# Patient Record
Sex: FEMALE | Race: WHITE | Employment: PART TIME | URBAN - NONMETROPOLITAN AREA
[De-identification: names, ages, dates, MRNs, and addresses within clinical notes are randomized per-mention and may not be internally consistent; named-entity substitution may affect disease eponyms.]

---

## 2017-07-06 ENCOUNTER — OFFICE VISIT (OUTPATIENT)
Dept: CARDIOLOGY | Age: 67
End: 2017-07-06

## 2017-07-06 VITALS
BODY MASS INDEX: 25.39 KG/M2 | HEART RATE: 60 BPM | SYSTOLIC BLOOD PRESSURE: 98 MMHG | DIASTOLIC BLOOD PRESSURE: 68 MMHG | WEIGHT: 152.6 LBS

## 2017-07-06 DIAGNOSIS — Z95.5 PRESENCE OF STENT IN LAD CORONARY ARTERY: Primary | ICD-10-CM

## 2017-07-06 PROCEDURE — G8427 DOCREV CUR MEDS BY ELIG CLIN: HCPCS | Performed by: INTERNAL MEDICINE

## 2017-07-06 PROCEDURE — 1090F PRES/ABSN URINE INCON ASSESS: CPT | Performed by: INTERNAL MEDICINE

## 2017-07-06 PROCEDURE — 1036F TOBACCO NON-USER: CPT | Performed by: INTERNAL MEDICINE

## 2017-07-06 PROCEDURE — G8419 CALC BMI OUT NRM PARAM NOF/U: HCPCS | Performed by: INTERNAL MEDICINE

## 2017-07-06 PROCEDURE — 3014F SCREEN MAMMO DOC REV: CPT | Performed by: INTERNAL MEDICINE

## 2017-07-06 PROCEDURE — 4040F PNEUMOC VAC/ADMIN/RCVD: CPT | Performed by: INTERNAL MEDICINE

## 2017-07-06 PROCEDURE — 99213 OFFICE O/P EST LOW 20 MIN: CPT | Performed by: INTERNAL MEDICINE

## 2017-07-06 PROCEDURE — 3017F COLORECTAL CA SCREEN DOC REV: CPT | Performed by: INTERNAL MEDICINE

## 2017-07-06 PROCEDURE — 1123F ACP DISCUSS/DSCN MKR DOCD: CPT | Performed by: INTERNAL MEDICINE

## 2017-07-06 PROCEDURE — G8400 PT W/DXA NO RESULTS DOC: HCPCS | Performed by: INTERNAL MEDICINE

## 2017-07-06 PROCEDURE — G8598 ASA/ANTIPLAT THER USED: HCPCS | Performed by: INTERNAL MEDICINE

## 2017-10-30 RX ORDER — ATORVASTATIN CALCIUM 40 MG/1
40 TABLET, FILM COATED ORAL DAILY
Qty: 90 TABLET | Refills: 1 | Status: SHIPPED | OUTPATIENT
Start: 2017-10-30 | End: 2019-02-04

## 2017-11-29 RX ORDER — CLOPIDOGREL BISULFATE 75 MG/1
75 TABLET ORAL DAILY
Qty: 30 TABLET | Refills: 5 | Status: SHIPPED | OUTPATIENT
Start: 2017-11-29 | End: 2018-05-04 | Stop reason: SDUPTHER

## 2018-05-04 ENCOUNTER — OFFICE VISIT (OUTPATIENT)
Dept: CARDIOLOGY CLINIC | Age: 68
End: 2018-05-04
Payer: MEDICARE

## 2018-05-04 VITALS
HEIGHT: 64 IN | WEIGHT: 149 LBS | BODY MASS INDEX: 25.44 KG/M2 | HEART RATE: 57 BPM | DIASTOLIC BLOOD PRESSURE: 64 MMHG | SYSTOLIC BLOOD PRESSURE: 98 MMHG

## 2018-05-04 DIAGNOSIS — E78.00 PURE HYPERCHOLESTEROLEMIA: ICD-10-CM

## 2018-05-04 DIAGNOSIS — I25.110 CORONARY ARTERY DISEASE INVOLVING NATIVE CORONARY ARTERY OF NATIVE HEART WITH UNSTABLE ANGINA PECTORIS (HCC): Primary | ICD-10-CM

## 2018-05-04 DIAGNOSIS — I10 ESSENTIAL HYPERTENSION: ICD-10-CM

## 2018-05-04 PROCEDURE — G8417 CALC BMI ABV UP PARAM F/U: HCPCS | Performed by: INTERNAL MEDICINE

## 2018-05-04 PROCEDURE — 1123F ACP DISCUSS/DSCN MKR DOCD: CPT | Performed by: INTERNAL MEDICINE

## 2018-05-04 PROCEDURE — 1036F TOBACCO NON-USER: CPT | Performed by: INTERNAL MEDICINE

## 2018-05-04 PROCEDURE — G8598 ASA/ANTIPLAT THER USED: HCPCS | Performed by: INTERNAL MEDICINE

## 2018-05-04 PROCEDURE — 93000 ELECTROCARDIOGRAM COMPLETE: CPT | Performed by: INTERNAL MEDICINE

## 2018-05-04 PROCEDURE — G8427 DOCREV CUR MEDS BY ELIG CLIN: HCPCS | Performed by: INTERNAL MEDICINE

## 2018-05-04 PROCEDURE — 99214 OFFICE O/P EST MOD 30 MIN: CPT | Performed by: INTERNAL MEDICINE

## 2018-05-04 PROCEDURE — G8400 PT W/DXA NO RESULTS DOC: HCPCS | Performed by: INTERNAL MEDICINE

## 2018-05-04 PROCEDURE — 1090F PRES/ABSN URINE INCON ASSESS: CPT | Performed by: INTERNAL MEDICINE

## 2018-05-04 PROCEDURE — 4040F PNEUMOC VAC/ADMIN/RCVD: CPT | Performed by: INTERNAL MEDICINE

## 2018-05-04 PROCEDURE — 3017F COLORECTAL CA SCREEN DOC REV: CPT | Performed by: INTERNAL MEDICINE

## 2018-05-04 RX ORDER — NAPROXEN 500 MG/1
500 TABLET ORAL 2 TIMES DAILY WITH MEALS
COMMUNITY
End: 2018-05-04

## 2018-05-04 RX ORDER — CLOPIDOGREL BISULFATE 75 MG/1
75 TABLET ORAL DAILY
Qty: 90 TABLET | Refills: 3 | Status: SHIPPED | OUTPATIENT
Start: 2018-05-04 | End: 2019-02-04 | Stop reason: SDUPTHER

## 2018-05-04 RX ORDER — ISOSORBIDE MONONITRATE 30 MG/1
30 TABLET, EXTENDED RELEASE ORAL DAILY
Qty: 90 TABLET | Refills: 3 | Status: SHIPPED | OUTPATIENT
Start: 2018-05-04 | End: 2018-11-08

## 2018-05-24 ENCOUNTER — HOSPITAL ENCOUNTER (OUTPATIENT)
Dept: NON INVASIVE DIAGNOSTICS | Age: 68
Discharge: HOME | End: 2018-05-24
Payer: MEDICARE

## 2018-05-24 DIAGNOSIS — I25.110 CORONARY ARTERY DISEASE INVOLVING NATIVE CORONARY ARTERY OF NATIVE HEART WITH UNSTABLE ANGINA PECTORIS (HCC): ICD-10-CM

## 2018-05-24 DIAGNOSIS — I10 ESSENTIAL HYPERTENSION: ICD-10-CM

## 2018-05-24 DIAGNOSIS — E78.00 PURE HYPERCHOLESTEROLEMIA: ICD-10-CM

## 2018-05-24 LAB
LV EF: 60 %
LVEF MODALITY: NORMAL

## 2018-05-24 PROCEDURE — 93306 TTE W/DOPPLER COMPLETE: CPT

## 2018-11-08 ENCOUNTER — OFFICE VISIT (OUTPATIENT)
Dept: CARDIOLOGY CLINIC | Age: 68
End: 2018-11-08
Payer: MEDICARE

## 2018-11-08 VITALS
DIASTOLIC BLOOD PRESSURE: 79 MMHG | SYSTOLIC BLOOD PRESSURE: 122 MMHG | BODY MASS INDEX: 25.52 KG/M2 | HEART RATE: 59 BPM | HEIGHT: 64 IN | WEIGHT: 149.5 LBS

## 2018-11-08 DIAGNOSIS — E78.00 PURE HYPERCHOLESTEROLEMIA: ICD-10-CM

## 2018-11-08 DIAGNOSIS — I10 ESSENTIAL HYPERTENSION: ICD-10-CM

## 2018-11-08 DIAGNOSIS — Z95.5 PRESENCE OF STENT IN LAD CORONARY ARTERY: ICD-10-CM

## 2018-11-08 DIAGNOSIS — I25.10 CORONARY ARTERY DISEASE INVOLVING NATIVE CORONARY ARTERY OF NATIVE HEART, ANGINA PRESENCE UNSPECIFIED: ICD-10-CM

## 2018-11-08 DIAGNOSIS — R07.9 CHEST PAIN IN ADULT: Primary | ICD-10-CM

## 2018-11-08 PROCEDURE — 3017F COLORECTAL CA SCREEN DOC REV: CPT | Performed by: INTERNAL MEDICINE

## 2018-11-08 PROCEDURE — G8417 CALC BMI ABV UP PARAM F/U: HCPCS | Performed by: INTERNAL MEDICINE

## 2018-11-08 PROCEDURE — G8400 PT W/DXA NO RESULTS DOC: HCPCS | Performed by: INTERNAL MEDICINE

## 2018-11-08 PROCEDURE — G8484 FLU IMMUNIZE NO ADMIN: HCPCS | Performed by: INTERNAL MEDICINE

## 2018-11-08 PROCEDURE — G8427 DOCREV CUR MEDS BY ELIG CLIN: HCPCS | Performed by: INTERNAL MEDICINE

## 2018-11-08 PROCEDURE — 1036F TOBACCO NON-USER: CPT | Performed by: INTERNAL MEDICINE

## 2018-11-08 PROCEDURE — 99214 OFFICE O/P EST MOD 30 MIN: CPT | Performed by: INTERNAL MEDICINE

## 2018-11-08 PROCEDURE — 1090F PRES/ABSN URINE INCON ASSESS: CPT | Performed by: INTERNAL MEDICINE

## 2018-11-08 PROCEDURE — G8598 ASA/ANTIPLAT THER USED: HCPCS | Performed by: INTERNAL MEDICINE

## 2018-11-08 PROCEDURE — 4040F PNEUMOC VAC/ADMIN/RCVD: CPT | Performed by: INTERNAL MEDICINE

## 2018-11-08 PROCEDURE — 1123F ACP DISCUSS/DSCN MKR DOCD: CPT | Performed by: INTERNAL MEDICINE

## 2018-11-08 PROCEDURE — 93000 ELECTROCARDIOGRAM COMPLETE: CPT | Performed by: INTERNAL MEDICINE

## 2018-11-08 PROCEDURE — 1101F PT FALLS ASSESS-DOCD LE1/YR: CPT | Performed by: INTERNAL MEDICINE

## 2018-11-08 RX ORDER — CYCLOBENZAPRINE HCL 5 MG
5 TABLET ORAL NIGHTLY
COMMUNITY
End: 2019-11-11 | Stop reason: ALTCHOICE

## 2018-11-08 RX ORDER — ISOSORBIDE MONONITRATE 60 MG/1
60 TABLET, EXTENDED RELEASE ORAL DAILY
Qty: 30 TABLET | Refills: 5 | Status: SHIPPED | OUTPATIENT
Start: 2018-11-08 | End: 2019-02-04 | Stop reason: SDUPTHER

## 2018-11-08 NOTE — PROGRESS NOTES
tablet Take 1 tablet by mouth daily 90 tablet 3    Omega-3 Fatty Acids (FISH OIL PO) Take 1 capsule by mouth daily      Multiple Vitamins-Minerals (MULTIVITAL) CHEW Take 1 capsule by mouth daily      citalopram (CELEXA) 40 MG tablet Take 40 mg by mouth daily      metoprolol (LOPRESSOR) 25 MG tablet Take 12.5 mg by mouth 2 times daily      aspirin 81 MG EC tablet Take 1 tablet by mouth daily 30 tablet 0    temazepam (RESTORIL) 15 MG capsule Take 15 mg by mouth nightly as needed for Sleep      nitroGLYCERIN (NITROSTAT) 0.4 MG SL tablet Place 1 tablet under the tongue every 5 minutes as needed for Chest pain 25 tablet 3    levothyroxine (SYNTHROID) 112 MCG tablet Take 112 mcg by mouth daily.  pitavastatin (LIVALO) 2 MG TABS tablet Take 1 tablet by mouth nightly 90 tablet 3    atorvastatin (LIPITOR) 40 MG tablet Take 1 tablet by mouth daily (Patient taking differently: Take 80 mg by mouth daily ) 90 tablet 1     No current facility-administered medications for this visit. Review of Systems -     General ROS: negative  Psychological ROS: negative  Hematological and Lymphatic ROS: No history of blood clots or bleeding disorder. Respiratory ROS: no cough, shortness of breath, or wheezing  Cardiovascular ROS: see HPI  Gastrointestinal ROS: negative  Genito-Urinary ROS: no dysuria, trouble voiding, or hematuria  Musculoskeletal ROS: negative  Neurological ROS: no TIA or stroke symptoms  Dermatological ROS: negative      Blood pressure 122/79, pulse 59, height 5' 4\" (1.626 m), weight 149 lb 8 oz (67.8 kg).         Physical Examination:    General appearance - alert, well appearing, and in no distress  Mental status - alert, oriented to person, place, and time  Neck - supple, no significant adenopathy, no JVD, or carotid bruits  Chest - clear to auscultation, no wheezes, rales or rhonchi, symmetric air entry  Heart - normal rate, regular rhythm, normal S1, S2, no murmurs, rubs, clicks or control. Patient is compliant with medical treatment. The  Fatigue better after Stop HCTZ 12.5 mg po qd   No dizziness     Hyperlipidemia: on statins, followed periodically. Patient need periodic lipid and liver profile. LP and LFT under F/u with pcp      Reviewed the records    D/w the pat the plan of care    Discussed use, benefit, and side effects of prescribed medications. All patient questions answered. Pt voiced understanding. Instructed to continue current medications, diet and exercise. Continue risk factor modification and medical management. Patient agreed with treatment plan. Follow up as directed.     RTC in 3 weeks      Gaudencio Nicole [facility]

## 2018-11-12 ENCOUNTER — TELEPHONE (OUTPATIENT)
Dept: CARDIOLOGY CLINIC | Age: 68
End: 2018-11-12

## 2018-11-14 ENCOUNTER — HOSPITAL ENCOUNTER (OUTPATIENT)
Dept: NON INVASIVE DIAGNOSTICS | Age: 68
Discharge: HOME | End: 2018-11-14
Payer: MEDICARE

## 2018-11-14 VITALS — HEIGHT: 64 IN | BODY MASS INDEX: 25.27 KG/M2 | WEIGHT: 148 LBS

## 2018-11-14 DIAGNOSIS — Z95.5 PRESENCE OF STENT IN LAD CORONARY ARTERY: ICD-10-CM

## 2018-11-14 DIAGNOSIS — E78.00 PURE HYPERCHOLESTEROLEMIA: ICD-10-CM

## 2018-11-14 DIAGNOSIS — R07.9 CHEST PAIN IN ADULT: ICD-10-CM

## 2018-11-14 DIAGNOSIS — I10 ESSENTIAL HYPERTENSION: ICD-10-CM

## 2018-11-14 DIAGNOSIS — I25.10 CORONARY ARTERY DISEASE INVOLVING NATIVE CORONARY ARTERY OF NATIVE HEART, ANGINA PRESENCE UNSPECIFIED: ICD-10-CM

## 2018-11-14 PROCEDURE — 78452 HT MUSCLE IMAGE SPECT MULT: CPT

## 2018-11-14 PROCEDURE — 93017 CV STRESS TEST TRACING ONLY: CPT | Performed by: INTERNAL MEDICINE

## 2018-11-14 PROCEDURE — 2709999900 HC NON-CHARGEABLE SUPPLY

## 2018-11-14 PROCEDURE — 6360000002 HC RX W HCPCS

## 2018-11-14 PROCEDURE — A9500 TC99M SESTAMIBI: HCPCS | Performed by: INTERNAL MEDICINE

## 2018-11-14 PROCEDURE — 3430000000 HC RX DIAGNOSTIC RADIOPHARMACEUTICAL: Performed by: INTERNAL MEDICINE

## 2018-11-14 RX ADMIN — Medication 9.9 MILLICURIE: at 11:52

## 2018-11-14 RX ADMIN — Medication 33.7 MILLICURIE: at 12:50

## 2019-01-17 ENCOUNTER — TELEPHONE (OUTPATIENT)
Dept: CARDIOLOGY CLINIC | Age: 69
End: 2019-01-17

## 2019-02-04 ENCOUNTER — OFFICE VISIT (OUTPATIENT)
Dept: CARDIOLOGY CLINIC | Age: 69
End: 2019-02-04
Payer: MEDICARE

## 2019-02-04 VITALS
HEART RATE: 60 BPM | WEIGHT: 158.25 LBS | HEIGHT: 64 IN | BODY MASS INDEX: 27.02 KG/M2 | DIASTOLIC BLOOD PRESSURE: 82 MMHG | SYSTOLIC BLOOD PRESSURE: 132 MMHG

## 2019-02-04 DIAGNOSIS — I10 ESSENTIAL HYPERTENSION: ICD-10-CM

## 2019-02-04 DIAGNOSIS — Z01.818 PRE-OP EVALUATION: Primary | ICD-10-CM

## 2019-02-04 DIAGNOSIS — E78.00 PURE HYPERCHOLESTEROLEMIA: ICD-10-CM

## 2019-02-04 DIAGNOSIS — I25.10 CORONARY ARTERY DISEASE INVOLVING NATIVE CORONARY ARTERY OF NATIVE HEART WITHOUT ANGINA PECTORIS: ICD-10-CM

## 2019-02-04 DIAGNOSIS — Z95.5 PRESENCE OF STENT IN LAD CORONARY ARTERY: ICD-10-CM

## 2019-02-04 PROBLEM — R07.9 CHEST PAIN IN ADULT: Status: RESOLVED | Noted: 2018-11-08 | Resolved: 2019-02-04

## 2019-02-04 PROCEDURE — 1123F ACP DISCUSS/DSCN MKR DOCD: CPT | Performed by: INTERNAL MEDICINE

## 2019-02-04 PROCEDURE — 1101F PT FALLS ASSESS-DOCD LE1/YR: CPT | Performed by: INTERNAL MEDICINE

## 2019-02-04 PROCEDURE — 3017F COLORECTAL CA SCREEN DOC REV: CPT | Performed by: INTERNAL MEDICINE

## 2019-02-04 PROCEDURE — 1090F PRES/ABSN URINE INCON ASSESS: CPT | Performed by: INTERNAL MEDICINE

## 2019-02-04 PROCEDURE — 1036F TOBACCO NON-USER: CPT | Performed by: INTERNAL MEDICINE

## 2019-02-04 PROCEDURE — G8484 FLU IMMUNIZE NO ADMIN: HCPCS | Performed by: INTERNAL MEDICINE

## 2019-02-04 PROCEDURE — G8598 ASA/ANTIPLAT THER USED: HCPCS | Performed by: INTERNAL MEDICINE

## 2019-02-04 PROCEDURE — 99214 OFFICE O/P EST MOD 30 MIN: CPT | Performed by: INTERNAL MEDICINE

## 2019-02-04 PROCEDURE — G8417 CALC BMI ABV UP PARAM F/U: HCPCS | Performed by: INTERNAL MEDICINE

## 2019-02-04 PROCEDURE — 4040F PNEUMOC VAC/ADMIN/RCVD: CPT | Performed by: INTERNAL MEDICINE

## 2019-02-04 PROCEDURE — G8427 DOCREV CUR MEDS BY ELIG CLIN: HCPCS | Performed by: INTERNAL MEDICINE

## 2019-02-04 PROCEDURE — G8400 PT W/DXA NO RESULTS DOC: HCPCS | Performed by: INTERNAL MEDICINE

## 2019-02-04 RX ORDER — ISOSORBIDE MONONITRATE 60 MG/1
60 TABLET, EXTENDED RELEASE ORAL DAILY
Qty: 90 TABLET | Refills: 3 | Status: SHIPPED | OUTPATIENT
Start: 2019-02-04 | End: 2020-02-20

## 2019-02-04 RX ORDER — CLOPIDOGREL BISULFATE 75 MG/1
75 TABLET ORAL DAILY
Qty: 90 TABLET | Refills: 3 | Status: SHIPPED | OUTPATIENT
Start: 2019-02-04 | End: 2020-02-20

## 2019-02-07 ENCOUNTER — TELEPHONE (OUTPATIENT)
Dept: CARDIOLOGY CLINIC | Age: 69
End: 2019-02-07

## 2019-02-09 LAB
ALBUMIN SERPL-MCNC: 4.1 G/DL
ALP BLD-CCNC: 106 U/L
ALT SERPL-CCNC: 18 U/L
ANION GAP SERPL CALCULATED.3IONS-SCNC: NORMAL MMOL/L
AST SERPL-CCNC: 21 U/L
BILIRUB SERPL-MCNC: 0.2 MG/DL (ref 0.1–1.4)
BUN BLDV-MCNC: 20 MG/DL
CALCIUM SERPL-MCNC: 9.5 MG/DL
CHLORIDE BLD-SCNC: 105 MMOL/L
CHOLESTEROL, TOTAL: 292 MG/DL
CHOLESTEROL/HDL RATIO: ABNORMAL
CO2: 105 MMOL/L
CREAT SERPL-MCNC: 1.2 MG/DL
GFR CALCULATED: NORMAL
GLUCOSE BLD-MCNC: 92 MG/DL
HDLC SERPL-MCNC: 41 MG/DL (ref 35–70)
LDL CHOLESTEROL CALCULATED: 206 MG/DL (ref 0–160)
POTASSIUM SERPL-SCNC: 4.9 MMOL/L
SODIUM BLD-SCNC: 141 MMOL/L
TOTAL PROTEIN: 7
TRIGL SERPL-MCNC: 223 MG/DL
TSH SERPL DL<=0.05 MIU/L-ACNC: 0.08 UIU/ML
VLDLC SERPL CALC-MCNC: 45 MG/DL

## 2019-03-06 PROBLEM — Z01.818 PRE-OP EVALUATION: Status: RESOLVED | Noted: 2019-02-04 | Resolved: 2019-03-06

## 2019-05-06 ENCOUNTER — OFFICE VISIT (OUTPATIENT)
Dept: CARDIOLOGY CLINIC | Age: 69
End: 2019-05-06
Payer: MEDICARE

## 2019-05-06 VITALS
SYSTOLIC BLOOD PRESSURE: 128 MMHG | BODY MASS INDEX: 26.29 KG/M2 | WEIGHT: 154 LBS | HEIGHT: 64 IN | DIASTOLIC BLOOD PRESSURE: 86 MMHG

## 2019-05-06 DIAGNOSIS — I10 ESSENTIAL HYPERTENSION: ICD-10-CM

## 2019-05-06 DIAGNOSIS — E78.00 PURE HYPERCHOLESTEROLEMIA: ICD-10-CM

## 2019-05-06 DIAGNOSIS — I25.10 CORONARY ARTERY DISEASE INVOLVING NATIVE CORONARY ARTERY OF NATIVE HEART WITHOUT ANGINA PECTORIS: Primary | ICD-10-CM

## 2019-05-06 DIAGNOSIS — Z95.5 PRESENCE OF STENT IN LAD CORONARY ARTERY: ICD-10-CM

## 2019-05-06 PROCEDURE — 99214 OFFICE O/P EST MOD 30 MIN: CPT | Performed by: INTERNAL MEDICINE

## 2019-05-06 PROCEDURE — 3017F COLORECTAL CA SCREEN DOC REV: CPT | Performed by: INTERNAL MEDICINE

## 2019-05-06 PROCEDURE — G8400 PT W/DXA NO RESULTS DOC: HCPCS | Performed by: INTERNAL MEDICINE

## 2019-05-06 PROCEDURE — 1123F ACP DISCUSS/DSCN MKR DOCD: CPT | Performed by: INTERNAL MEDICINE

## 2019-05-06 PROCEDURE — G8427 DOCREV CUR MEDS BY ELIG CLIN: HCPCS | Performed by: INTERNAL MEDICINE

## 2019-05-06 PROCEDURE — 4040F PNEUMOC VAC/ADMIN/RCVD: CPT | Performed by: INTERNAL MEDICINE

## 2019-05-06 PROCEDURE — 1036F TOBACCO NON-USER: CPT | Performed by: INTERNAL MEDICINE

## 2019-05-06 PROCEDURE — 1090F PRES/ABSN URINE INCON ASSESS: CPT | Performed by: INTERNAL MEDICINE

## 2019-05-06 PROCEDURE — G8598 ASA/ANTIPLAT THER USED: HCPCS | Performed by: INTERNAL MEDICINE

## 2019-05-06 PROCEDURE — G8417 CALC BMI ABV UP PARAM F/U: HCPCS | Performed by: INTERNAL MEDICINE

## 2019-05-06 NOTE — PROGRESS NOTES
Chief Complaint   Patient presents with    Check-Up    Coronary Artery Disease   former pat of Dr. Tiffany Grey  Hx of CAD with multiple stent    Pt here for 3 mo check up     Denied cp, dizziness , palpitation ,. Edema or SOB    Off statin due to intolerance and now on rrepatha    Record reviewed    Has been cp free for several months    Patient Active Problem List   Diagnosis    Hyperlipidemia    Hypothyroidism    Hypertension    ASHD (arteriosclerotic heart disease)    Presence of stent in LAD coronary artery and RCA 40 to 50% as per cath 2012     S/P cardiac catheterization    Coronary artery disease involving native coronary artery of native heart       Past Surgical History:   Procedure Laterality Date    CARDIAC CATHETERIZATION  2012    CARDIOVASCULAR STRESS TEST      CORONARY ANGIOPLASTY WITH STENT PLACEMENT  11/2009    EYE SURGERY         No Known Allergies     Family History   Problem Relation Age of Onset    Heart Disease Mother     Kidney Disease Mother     Heart Disease Father     Cancer Father     Heart Disease Brother     Diabetes Brother         Social History     Socioeconomic History    Marital status:      Spouse name: Not on file    Number of children: Not on file    Years of education: Not on file    Highest education level: Not on file   Occupational History    Occupation:    Social Needs    Financial resource strain: Not on file    Food insecurity:     Worry: Not on file     Inability: Not on file    Transportation needs:     Medical: Not on file     Non-medical: Not on file   Tobacco Use    Smoking status: Passive Smoke Exposure - Never Smoker    Smokeless tobacco: Never Used   Substance and Sexual Activity    Alcohol use: No    Drug use: No    Sexual activity: Not on file   Lifestyle    Physical activity:     Days per week: Not on file     Minutes per session: Not on file    Stress: Not on file   Relationships    Social connections:     Talks on phone: Not on file     Gets together: Not on file     Attends Taoism service: Not on file     Active member of club or organization: Not on file     Attends meetings of clubs or organizations: Not on file     Relationship status: Not on file    Intimate partner violence:     Fear of current or ex partner: Not on file     Emotionally abused: Not on file     Physically abused: Not on file     Forced sexual activity: Not on file   Other Topics Concern    Not on file   Social History Narrative    Not on file       Current Outpatient Medications   Medication Sig Dispense Refill    isosorbide mononitrate (IMDUR) 60 MG extended release tablet Take 1 tablet by mouth daily 90 tablet 3    clopidogrel (PLAVIX) 75 MG tablet Take 1 tablet by mouth daily 90 tablet 3    metoprolol tartrate (LOPRESSOR) 25 MG tablet Take 0.5 tablets by mouth 2 times daily 180 tablet 3    Evolocumab 140 MG/ML SOSY Inject 140 mg into the skin every 14 days 1 Syringe 11    cyclobenzaprine (FLEXERIL) 5 MG tablet Take 5 mg by mouth nightly      Omega-3 Fatty Acids (FISH OIL PO) Take 1 capsule by mouth daily      Multiple Vitamins-Minerals (MULTIVITAL) CHEW Take 1 capsule by mouth daily      citalopram (CELEXA) 40 MG tablet Take 40 mg by mouth daily      aspirin 81 MG EC tablet Take 1 tablet by mouth daily 30 tablet 0    temazepam (RESTORIL) 15 MG capsule Take 15 mg by mouth nightly as needed for Sleep      nitroGLYCERIN (NITROSTAT) 0.4 MG SL tablet Place 1 tablet under the tongue every 5 minutes as needed for Chest pain 25 tablet 3    levothyroxine (SYNTHROID) 112 MCG tablet Take 112 mcg by mouth daily. No current facility-administered medications for this visit. Review of Systems -     General ROS: negative  Psychological ROS: negative  Hematological and Lymphatic ROS: No history of blood clots or bleeding disorder.    Respiratory ROS: no cough, shortness of breath, or wheezing  Cardiovascular ROS: see HPI  Gastrointestinal ROS: negative  Genito-Urinary ROS: no dysuria, trouble voiding, or hematuria  Musculoskeletal ROS: negative  Neurological ROS: no TIA or stroke symptoms  Dermatological ROS: negative      Blood pressure 128/86, height 5' 4\" (1.626 m), weight 154 lb (69.9 kg). Physical Examination:    General appearance - alert, well appearing, and in no distress  Mental status - alert, oriented to person, place, and time  Neck - supple, no significant adenopathy, no JVD, or carotid bruits  Chest - clear to auscultation, no wheezes, rales or rhonchi, symmetric air entry  Heart - normal rate, regular rhythm, normal S1, S2, no murmurs, rubs, clicks or gallops  Abdomen - soft, nontender, nondistended, no masses or organomegaly  Neurological - alert, oriented, normal speech, no focal findings or movement disorder noted  Musculoskeletal - no joint tenderness, deformity or swelling  Extremities - peripheral pulses normal, no pedal edema, no clubbing or cyanosis  Skin - normal coloration and turgor, no rashes, no suspicious skin lesions noted    Lab  No results for input(s): CKTOTAL, CKMB, CKMBINDEX, TROPONINI in the last 72 hours.   CBC:   Lab Results   Component Value Date    WBC 3.6 10/14/2016    RBC 4.00 10/14/2016    HGB 12.5 10/14/2016    HCT 36.9 10/14/2016    MCV 92.3 10/14/2016    MCH 31.1 10/14/2016    MCHC 33.7 10/14/2016    RDW 14.1 10/14/2016     10/14/2016    MPV 8.2 10/14/2016     BMP:    Lab Results   Component Value Date     02/09/2019    K 4.9 02/09/2019     02/09/2019    CO2 105 02/09/2019    BUN 20 02/09/2019    LABALBU 4.1 02/09/2019    CREATININE 1.2 02/09/2019    CALCIUM 9.5 02/09/2019    LABGLOM 45 10/14/2016    GLUCOSE 92 02/09/2019     Hepatic Function Panel:    Lab Results   Component Value Date    ALKPHOS 106 02/09/2019    ALT 18 02/09/2019    AST 21 02/09/2019    BILITOT 0.2 02/09/2019    LABALBU 4.1 02/09/2019     Magnesium:  No results found for: MG  Warfarin treatment and with constant vigilance to changes in symptoms and also any potential side effects. Return for care or seek medical attention immediately if symptoms got worse and/or develop new symptoms. Coronary artery disease, . Denies  change in breathing pattern  Echo- WNL  Nuc stress good  Has been cp free  Doing well  Imdur 60 mg po qd    Hypertension, on medical treatment. Seems to be under good control. Patient is compliant with medical treatment. The  Fatigue better after Stop HCTZ 12.5 mg po qd   No dizziness     Hyperlipidemia: on statins, followed periodically. Patient need periodic lipid and liver profile. Hyperlipidemia: pat off  Statins, pravastatin, Lipitor for myalgia  HLP  Off statin    Cont  PCS K9 repatha  Repeat LP and LFT in 4 weeks    Reviewed the records    D/w the pat the plan of care    Discussed use, benefit, and side effects of prescribed medications. All patient questions answered. Pt voiced understanding. Instructed to continue current medications, diet and exercise. Continue risk factor modification and medical management. Patient agreed with treatment plan. Follow up as directed.     RTC in 6 months      [facility]

## 2019-05-07 ENCOUNTER — TELEPHONE (OUTPATIENT)
Dept: CARDIOLOGY CLINIC | Age: 69
End: 2019-05-07

## 2019-05-07 NOTE — TELEPHONE ENCOUNTER
Pt called asking if Dr. Ryann Sullivan would recommend any med to help with sleep ,that would be ok with other meds. pt PCP has pt on temazepam to help with sleep . Pt c/o up multiple times and does not sleep very well.  advise?

## 2019-08-23 LAB
ALBUMIN SERPL-MCNC: 4.2 G/DL (ref 3.2–5.3)
ALK PHOSPHATASE: 86 U/L (ref 39–130)
ALT SERPL-CCNC: 13 U/L (ref 0–31)
AST SERPL-CCNC: 20 U/L (ref 0–41)
BILIRUB SERPL-MCNC: 0.6 MG/DL (ref 0.3–1.2)
BILIRUBIN DIRECT: 0.1 MG/DL (ref 0–0.4)
CHOLESTEROL/HDL RATIO: 4 (ref 1–5)
CHOLESTEROL: 198 MG/DL (ref 150–200)
HDLC SERPL-MCNC: 50 MG/DL
LDL CHOLESTEROL CALCULATED: 126 MG/DL
LDL/HDL RATIO: 2.5
TOTAL PROTEIN: 7 G/DL (ref 6–8)
TRIGL SERPL-MCNC: 112 MG/DL (ref 27–150)
VLDLC SERPL CALC-MCNC: 22 MG/DL (ref 0–30)

## 2019-11-11 ENCOUNTER — OFFICE VISIT (OUTPATIENT)
Dept: CARDIOLOGY CLINIC | Age: 69
End: 2019-11-11
Payer: MEDICARE

## 2019-11-11 VITALS
SYSTOLIC BLOOD PRESSURE: 109 MMHG | WEIGHT: 142 LBS | DIASTOLIC BLOOD PRESSURE: 72 MMHG | HEART RATE: 50 BPM | HEIGHT: 64 IN | BODY MASS INDEX: 24.24 KG/M2

## 2019-11-11 DIAGNOSIS — I10 ESSENTIAL HYPERTENSION: ICD-10-CM

## 2019-11-11 DIAGNOSIS — Z95.5 PRESENCE OF STENT IN LAD CORONARY ARTERY: ICD-10-CM

## 2019-11-11 DIAGNOSIS — E78.00 PURE HYPERCHOLESTEROLEMIA: ICD-10-CM

## 2019-11-11 DIAGNOSIS — I25.10 CORONARY ARTERY DISEASE INVOLVING NATIVE CORONARY ARTERY OF NATIVE HEART WITHOUT ANGINA PECTORIS: Primary | ICD-10-CM

## 2019-11-11 PROCEDURE — G8420 CALC BMI NORM PARAMETERS: HCPCS | Performed by: INTERNAL MEDICINE

## 2019-11-11 PROCEDURE — 99214 OFFICE O/P EST MOD 30 MIN: CPT | Performed by: INTERNAL MEDICINE

## 2019-11-11 PROCEDURE — G8484 FLU IMMUNIZE NO ADMIN: HCPCS | Performed by: INTERNAL MEDICINE

## 2019-11-11 PROCEDURE — 3017F COLORECTAL CA SCREEN DOC REV: CPT | Performed by: INTERNAL MEDICINE

## 2019-11-11 PROCEDURE — 93000 ELECTROCARDIOGRAM COMPLETE: CPT | Performed by: INTERNAL MEDICINE

## 2019-11-11 PROCEDURE — 1090F PRES/ABSN URINE INCON ASSESS: CPT | Performed by: INTERNAL MEDICINE

## 2019-11-11 PROCEDURE — G8598 ASA/ANTIPLAT THER USED: HCPCS | Performed by: INTERNAL MEDICINE

## 2019-11-11 PROCEDURE — 4040F PNEUMOC VAC/ADMIN/RCVD: CPT | Performed by: INTERNAL MEDICINE

## 2019-11-11 PROCEDURE — G8427 DOCREV CUR MEDS BY ELIG CLIN: HCPCS | Performed by: INTERNAL MEDICINE

## 2019-11-11 PROCEDURE — G8400 PT W/DXA NO RESULTS DOC: HCPCS | Performed by: INTERNAL MEDICINE

## 2019-11-11 PROCEDURE — 1036F TOBACCO NON-USER: CPT | Performed by: INTERNAL MEDICINE

## 2019-11-11 PROCEDURE — 1123F ACP DISCUSS/DSCN MKR DOCD: CPT | Performed by: INTERNAL MEDICINE

## 2019-11-11 RX ORDER — HYDROCODONE BITARTRATE AND ACETAMINOPHEN 5; 325 MG/1; MG/1
1 TABLET ORAL EVERY 6 HOURS PRN
COMMUNITY
End: 2020-05-18

## 2019-11-26 RX ORDER — EZETIMIBE 10 MG/1
10 TABLET ORAL DAILY
Qty: 90 TABLET | Refills: 1 | Status: SHIPPED | OUTPATIENT
Start: 2019-11-26 | End: 2020-05-18

## 2019-12-04 ENCOUNTER — TELEPHONE (OUTPATIENT)
Dept: CARDIOLOGY CLINIC | Age: 69
End: 2019-12-04

## 2019-12-23 ENCOUNTER — TELEPHONE (OUTPATIENT)
Dept: CARDIOLOGY CLINIC | Age: 69
End: 2019-12-23

## 2020-02-06 ENCOUNTER — TELEPHONE (OUTPATIENT)
Dept: CARDIOLOGY CLINIC | Age: 70
End: 2020-02-06

## 2020-02-06 NOTE — TELEPHONE ENCOUNTER
[facility]art [facility]man office asking if patient can hold Plavix prior to cataract removal on 4-?     Fax [phone]  Ph [phone]

## 2020-02-20 RX ORDER — CLOPIDOGREL BISULFATE 75 MG/1
75 TABLET ORAL DAILY
Qty: 90 TABLET | Refills: 0 | Status: SHIPPED | OUTPATIENT
Start: 2020-02-20 | End: 2020-06-01

## 2020-02-20 RX ORDER — ISOSORBIDE MONONITRATE 60 MG/1
60 TABLET, EXTENDED RELEASE ORAL DAILY
Qty: 90 TABLET | Refills: 0 | Status: SHIPPED | OUTPATIENT
Start: 2020-02-20 | End: 2020-06-01

## 2020-05-15 LAB
ALBUMIN SERPL-MCNC: 4 G/DL (ref 3.2–5.3)
ALK PHOSPHATASE: 79 U/L (ref 39–130)
ALT SERPL-CCNC: 13 U/L (ref 0–31)
AST SERPL-CCNC: 19 U/L (ref 0–41)
BILIRUB SERPL-MCNC: 0.3 MG/DL (ref 0.3–1.2)
BILIRUBIN DIRECT: 0.1 MG/DL (ref 0–0.4)
CHOLESTEROL/HDL RATIO: 6.9 (ref 1–5)
CHOLESTEROL: 322 MG/DL (ref 150–200)
HDLC SERPL-MCNC: 47 MG/DL
LDL CHOLESTEROL CALCULATED: 237 MG/DL
LDL/HDL RATIO: 5
TOTAL PROTEIN: 7.1 G/DL (ref 6–8)
TRIGL SERPL-MCNC: 191 MG/DL (ref 27–150)
VLDLC SERPL CALC-MCNC: 38 MG/DL (ref 0–30)

## 2020-05-18 ENCOUNTER — OFFICE VISIT (OUTPATIENT)
Dept: CARDIOLOGY CLINIC | Age: 70
End: 2020-05-18
Payer: MEDICARE

## 2020-05-18 VITALS
SYSTOLIC BLOOD PRESSURE: 128 MMHG | BODY MASS INDEX: 23.75 KG/M2 | HEIGHT: 64 IN | WEIGHT: 139.13 LBS | HEART RATE: 56 BPM | DIASTOLIC BLOOD PRESSURE: 84 MMHG

## 2020-05-18 PROBLEM — R07.89 CHEST PAIN, ATYPICAL: Status: ACTIVE | Noted: 2020-05-18

## 2020-05-18 PROCEDURE — 1123F ACP DISCUSS/DSCN MKR DOCD: CPT | Performed by: INTERNAL MEDICINE

## 2020-05-18 PROCEDURE — 1090F PRES/ABSN URINE INCON ASSESS: CPT | Performed by: INTERNAL MEDICINE

## 2020-05-18 PROCEDURE — G8420 CALC BMI NORM PARAMETERS: HCPCS | Performed by: INTERNAL MEDICINE

## 2020-05-18 PROCEDURE — 93000 ELECTROCARDIOGRAM COMPLETE: CPT | Performed by: INTERNAL MEDICINE

## 2020-05-18 PROCEDURE — 3017F COLORECTAL CA SCREEN DOC REV: CPT | Performed by: INTERNAL MEDICINE

## 2020-05-18 PROCEDURE — G8400 PT W/DXA NO RESULTS DOC: HCPCS | Performed by: INTERNAL MEDICINE

## 2020-05-18 PROCEDURE — 99214 OFFICE O/P EST MOD 30 MIN: CPT | Performed by: INTERNAL MEDICINE

## 2020-05-18 PROCEDURE — 1036F TOBACCO NON-USER: CPT | Performed by: INTERNAL MEDICINE

## 2020-05-18 PROCEDURE — 4040F PNEUMOC VAC/ADMIN/RCVD: CPT | Performed by: INTERNAL MEDICINE

## 2020-05-18 PROCEDURE — G8427 DOCREV CUR MEDS BY ELIG CLIN: HCPCS | Performed by: INTERNAL MEDICINE

## 2020-05-18 RX ORDER — EVOLOCUMAB 140 MG/ML
INJECTION, SOLUTION SUBCUTANEOUS
COMMUNITY
End: 2020-05-18 | Stop reason: SDUPTHER

## 2020-05-18 RX ORDER — HYDROCHLOROTHIAZIDE 12.5 MG/1
12.5 TABLET ORAL DAILY
COMMUNITY
Start: 2020-02-20 | End: 2022-03-29

## 2020-05-18 RX ORDER — LEVOTHYROXINE SODIUM 0.1 MG/1
88 TABLET ORAL
COMMUNITY
Start: 2020-04-02

## 2020-05-18 RX ORDER — EVOLOCUMAB 140 MG/ML
140 INJECTION, SOLUTION SUBCUTANEOUS
Qty: 1 SYRINGE | Refills: 11 | Status: SHIPPED | OUTPATIENT
Start: 2020-05-18 | End: 2020-09-21

## 2020-05-18 RX ORDER — TRAZODONE HYDROCHLORIDE 50 MG/1
25 TABLET ORAL NIGHTLY
COMMUNITY

## 2020-05-18 NOTE — PROGRESS NOTES
Used   Substance and Sexual Activity    Alcohol use: No    Drug use: No    Sexual activity: Not on file   Lifestyle    Physical activity     Days per week: Not on file     Minutes per session: Not on file    Stress: Not on file   Relationships    Social connections     Talks on phone: Not on file     Gets together: Not on file     Attends Taoism service: Not on file     Active member of club or organization: Not on file     Attends meetings of clubs or organizations: Not on file     Relationship status: Not on file    Intimate partner violence     Fear of current or ex partner: Not on file     Emotionally abused: Not on file     Physically abused: Not on file     Forced sexual activity: Not on file   Other Topics Concern    Not on file   Social History Narrative    Not on file       Current Outpatient Medications   Medication Sig Dispense Refill    hydrochlorothiazide (HYDRODIURIL) 12.5 MG tablet Take 12.5 mg by mouth daily       levothyroxine (SYNTHROID) 100 MCG tablet TAKE ONE TABLET BY MOUTH ONCE DAILY ON EMPTY STOMACH IN THE MORNING.  traZODone (DESYREL) 50 MG tablet Take 25 mg by mouth nightly      clopidogrel (PLAVIX) 75 MG tablet TAKE 1 TABLET BY MOUTH DAILY 90 tablet 0    isosorbide mononitrate (IMDUR) 60 MG extended release tablet TAKE 1 TABLET BY MOUTH DAILY 90 tablet 0    metoprolol tartrate (LOPRESSOR) 25 MG tablet Take 0.5 tablets by mouth 2 times daily 180 tablet 3    Multiple Vitamins-Minerals (MULTIVITAL) CHEW Take 1 capsule by mouth daily      citalopram (CELEXA) 40 MG tablet Take 40 mg by mouth daily      aspirin 81 MG EC tablet Take 1 tablet by mouth daily 30 tablet 0    temazepam (RESTORIL) 15 MG capsule Take 15 mg by mouth nightly as needed for Sleep      nitroGLYCERIN (NITROSTAT) 0.4 MG SL tablet Place 1 tablet under the tongue every 5 minutes as needed for Chest pain 25 tablet 3     No current facility-administered medications for this visit.         Review of Systems -     General ROS: negative  Psychological ROS: negative  Hematological and Lymphatic ROS: No history of blood clots or bleeding disorder. Respiratory ROS: no cough, shortness of breath, or wheezing  Cardiovascular ROS: see HPI  Gastrointestinal ROS: negative  Genito-Urinary ROS: no dysuria, trouble voiding, or hematuria  Musculoskeletal ROS: negative  Neurological ROS: no TIA or stroke symptoms  Dermatological ROS: negative      Blood pressure 128/84, pulse 56, height 5' 4\" (1.626 m), weight 139 lb 2 oz (63.1 kg). Physical Examination:    General appearance - alert, well appearing, and in no distress  Mental status - alert, oriented to person, place, and time  Neck - supple, no significant adenopathy, no JVD, or carotid bruits  Chest - clear to auscultation, no wheezes, rales or rhonchi, symmetric air entry  Heart - normal rate, regular rhythm, normal S1, S2, no murmurs, rubs, clicks or gallops  Abdomen - soft, nontender, nondistended, no masses or organomegaly  Neurological - alert, oriented, normal speech, no focal findings or movement disorder noted  Musculoskeletal - no joint tenderness, deformity or swelling  Extremities - peripheral pulses normal, no pedal edema, no clubbing or cyanosis  Skin - normal coloration and turgor, no rashes, no suspicious skin lesions noted    Lab  No results for input(s): CKTOTAL, CKMB, CKMBINDEX, TROPONINI in the last 72 hours.   CBC:   Lab Results   Component Value Date    WBC 3.6 10/14/2016    RBC 4.00 10/14/2016    HGB 12.5 10/14/2016    HCT 36.9 10/14/2016    MCV 92.3 10/14/2016    MCH 31.1 10/14/2016    MCHC 33.7 10/14/2016    RDW 14.1 10/14/2016     10/14/2016    MPV 8.2 10/14/2016     BMP:    Lab Results   Component Value Date     02/09/2019    K 4.9 02/09/2019     02/09/2019    CO2 105 02/09/2019    BUN 20 02/09/2019    LABALBU 4.0 05/14/2020    CREATININE 1.2 02/09/2019    CALCIUM 9.5 02/09/2019    LABGLOM 45 10/14/2016    GLUCOSE 92 02/09/2019

## 2020-05-19 ENCOUNTER — TELEPHONE (OUTPATIENT)
Dept: CARDIOLOGY CLINIC | Age: 70
End: 2020-05-19

## 2020-05-19 NOTE — TELEPHONE ENCOUNTER
Patient is scheduled 06.04.2020  Echo 930  Stress 1130am     Patient has been notified   Instructions have been mailed

## 2020-05-19 NOTE — TELEPHONE ENCOUNTER
[address] on nurse line . Pt has no insurance for [address] [place] [address] qualified in the past for safety net to get free. Rx will be on hold. LM on Joyce JIMÉNEZ with pt assistance.

## 2020-06-01 RX ORDER — CLOPIDOGREL BISULFATE 75 MG/1
TABLET ORAL
Qty: 90 TABLET | Refills: 1 | Status: SHIPPED | OUTPATIENT
Start: 2020-06-01 | End: 2020-09-21 | Stop reason: SDUPTHER

## 2020-06-01 RX ORDER — ISOSORBIDE MONONITRATE 60 MG/1
60 TABLET, EXTENDED RELEASE ORAL DAILY
Qty: 90 TABLET | Refills: 1 | Status: SHIPPED | OUTPATIENT
Start: 2020-06-01 | End: 2020-09-21 | Stop reason: SDUPTHER

## 2020-06-04 ENCOUNTER — HOSPITAL ENCOUNTER (OUTPATIENT)
Dept: NON INVASIVE DIAGNOSTICS | Age: 70
Discharge: HOME | End: 2020-06-04
Payer: MEDICARE

## 2020-06-04 VITALS — WEIGHT: 137 LBS | BODY MASS INDEX: 23.39 KG/M2 | HEIGHT: 64 IN

## 2020-06-04 LAB
LV EF: 65 %
LVEF MODALITY: NORMAL

## 2020-06-04 PROCEDURE — 6360000002 HC RX W HCPCS

## 2020-06-04 PROCEDURE — 78452 HT MUSCLE IMAGE SPECT MULT: CPT

## 2020-06-04 PROCEDURE — 93306 TTE W/DOPPLER COMPLETE: CPT

## 2020-06-04 PROCEDURE — A9500 TC99M SESTAMIBI: HCPCS | Performed by: INTERNAL MEDICINE

## 2020-06-04 PROCEDURE — 93017 CV STRESS TEST TRACING ONLY: CPT

## 2020-06-04 PROCEDURE — 3430000000 HC RX DIAGNOSTIC RADIOPHARMACEUTICAL: Performed by: INTERNAL MEDICINE

## 2020-06-04 RX ADMIN — Medication 8.9 MILLICURIE: at 10:35

## 2020-06-04 RX ADMIN — Medication 30.8 MILLICURIE: at 11:40

## 2020-09-21 ENCOUNTER — OFFICE VISIT (OUTPATIENT)
Dept: CARDIOLOGY CLINIC | Age: 70
End: 2020-09-21
Payer: MEDICARE

## 2020-09-21 VITALS
SYSTOLIC BLOOD PRESSURE: 130 MMHG | HEART RATE: 60 BPM | BODY MASS INDEX: 25.25 KG/M2 | WEIGHT: 142.5 LBS | DIASTOLIC BLOOD PRESSURE: 78 MMHG | HEIGHT: 63 IN

## 2020-09-21 PROCEDURE — 99214 OFFICE O/P EST MOD 30 MIN: CPT | Performed by: INTERNAL MEDICINE

## 2020-09-21 PROCEDURE — G8417 CALC BMI ABV UP PARAM F/U: HCPCS | Performed by: INTERNAL MEDICINE

## 2020-09-21 PROCEDURE — G8427 DOCREV CUR MEDS BY ELIG CLIN: HCPCS | Performed by: INTERNAL MEDICINE

## 2020-09-21 PROCEDURE — 3017F COLORECTAL CA SCREEN DOC REV: CPT | Performed by: INTERNAL MEDICINE

## 2020-09-21 PROCEDURE — G8400 PT W/DXA NO RESULTS DOC: HCPCS | Performed by: INTERNAL MEDICINE

## 2020-09-21 PROCEDURE — 1036F TOBACCO NON-USER: CPT | Performed by: INTERNAL MEDICINE

## 2020-09-21 PROCEDURE — 1090F PRES/ABSN URINE INCON ASSESS: CPT | Performed by: INTERNAL MEDICINE

## 2020-09-21 PROCEDURE — 4040F PNEUMOC VAC/ADMIN/RCVD: CPT | Performed by: INTERNAL MEDICINE

## 2020-09-21 PROCEDURE — 1123F ACP DISCUSS/DSCN MKR DOCD: CPT | Performed by: INTERNAL MEDICINE

## 2020-09-21 RX ORDER — ROSUVASTATIN CALCIUM 20 MG/1
20 TABLET, COATED ORAL DAILY
Qty: 30 TABLET | Refills: 3 | Status: SHIPPED | OUTPATIENT
Start: 2020-09-21 | End: 2020-12-16

## 2020-09-21 RX ORDER — ISOSORBIDE MONONITRATE 60 MG/1
60 TABLET, EXTENDED RELEASE ORAL DAILY
Qty: 90 TABLET | Refills: 3 | Status: SHIPPED | OUTPATIENT
Start: 2020-09-21 | End: 2021-03-29 | Stop reason: SDUPTHER

## 2020-09-21 RX ORDER — CLOPIDOGREL BISULFATE 75 MG/1
TABLET ORAL
Qty: 90 TABLET | Refills: 3 | Status: SHIPPED | OUTPATIENT
Start: 2020-09-21 | End: 2021-09-24

## 2020-09-21 NOTE — PROGRESS NOTES
Chief Complaint   Patient presents with    Check-Up    Coronary Artery Disease   former pat of Dr. Jodie Stevens  Hx of CAD with multiple stent    4 months F/u      Pt denies chest pain , heart palpitations, sob, swelling in legs and feet,     Pt continues with dizziness at times,     Hx of atypical cp once in a while, last 5 seconds  Not exertion related  better    Denied  dizziness , palpitation , Edema or SOB    Off statin due to intolerance and now on rrepatha    Record reviewed    Has been cp free for several months    Patient Active Problem List   Diagnosis    Hyperlipidemia    Hypothyroidism    Hypertension    ASHD (arteriosclerotic heart disease)    Presence of stent in LAD coronary artery and RCA 40 to 50% as per cath 2012     S/P cardiac catheterization    Coronary artery disease involving native coronary artery of native heart    Chest pain, atypical       Past Surgical History:   Procedure Laterality Date    CARDIAC CATHETERIZATION  2012    CARDIOVASCULAR STRESS TEST      COLECTOMY  01/10/2020    COLECTOMY  01/2020    [facility] with Dr. Dwayne Estrada  11/2009    EYE SURGERY         Allergies   Allergen Reactions    Requip [Ropinirole Hcl]     Statins         Family History   Problem Relation Age of Onset    Heart Disease Mother     Kidney Disease Mother     Heart Disease Father     Cancer Father     Heart Disease Brother     Diabetes Brother         Social History     Socioeconomic History    Marital status:      Spouse name: Not on file    Number of children: Not on file    Years of education: Not on file    Highest education level: Not on file   Occupational History    Occupation:    Social Needs    Financial resource strain: Not on file    Food insecurity     Worry: Not on file     Inability: Not on file    Transportation needs     Medical: Not on file     Non-medical: Not on file   Tobacco Use    Smoking status: Passive Smoke Exposure - Never Smoker    Smokeless tobacco: Never Used   Substance and Sexual Activity    Alcohol use: No    Drug use: No    Sexual activity: Not on file   Lifestyle    Physical activity     Days per week: Not on file     Minutes per session: Not on file    Stress: Not on file   Relationships    Social connections     Talks on phone: Not on file     Gets together: Not on file     Attends [employer] service: Not on file     Active member of club or organization: Not on file     Attends meetings of clubs or organizations: Not on file     Relationship status: Not on file    Intimate partner violence     Fear of current or ex partner: Not on file     Emotionally abused: Not on file     Physically abused: Not on file     Forced sexual activity: Not on file   Other Topics Concern    Not on file   Social History Narrative    Not on file       Current Outpatient Medications   Medication Sig Dispense Refill    clopidogrel (PLAVIX) 75 MG tablet TAKE 1 TABLET BY MOUTH DAILY AS DIRECTED 90 tablet 3    isosorbide mononitrate (IMDUR) 60 MG extended release tablet Take 1 tablet by mouth daily 90 tablet 3    metoprolol tartrate (LOPRESSOR) 25 MG tablet Take 0.5 tablets by mouth 2 times daily 180 tablet 3    rosuvastatin (CRESTOR) 20 MG tablet Take 1 tablet by mouth daily 30 tablet 3    hydrochlorothiazide (HYDRODIURIL) 12.5 MG tablet Take 12.5 mg by mouth daily       levothyroxine (SYNTHROID) 100 MCG tablet TAKE ONE TABLET BY MOUTH ONCE DAILY ON EMPTY STOMACH IN THE MORNING.       traZODone (DESYREL) 50 MG tablet Take 25 mg by mouth nightly      Multiple Vitamins-Minerals (MULTIVITAL) CHEW Take 1 capsule by mouth daily      citalopram (CELEXA) 40 MG tablet Take 40 mg by mouth daily      aspirin 81 MG EC tablet Take 1 tablet by mouth daily 30 tablet 0    temazepam (RESTORIL) 15 MG capsule Take 15 mg by mouth nightly as needed for Sleep      nitroGLYCERIN (NITROSTAT) 0.4 MG SL tablet Place 105 02/09/2019    BUN 20 02/09/2019    LABALBU 4.0 05/14/2020    CREATININE 1.2 02/09/2019    CALCIUM 9.5 02/09/2019    LABGLOM 45 10/14/2016    GLUCOSE 92 02/09/2019     Hepatic Function Panel:    Lab Results   Component Value Date    ALKPHOS 79 05/14/2020    ALKPHOS 106 02/09/2019    ALT 13 05/14/2020    AST 19 05/14/2020    PROT 7.1 05/14/2020    BILITOT 0.3 05/14/2020    BILIDIR 0.1 05/14/2020    LABALBU 4.0 05/14/2020     Magnesium:  No results found for: MG  Warfarin PT/INR:  No components found for: PTPATWAR, PTINRWAR  HgBA1c:  No results found for: LABA1C  FLP:    Lab Results   Component Value Date    TRIG 191 05/14/2020    HDL 47 05/14/2020    LDLCALC 237 05/14/2020    LABVLDL 38 05/14/2020     TSH:    Lab Results   Component Value Date    TSH 0.08 02/09/2019     CONCLUSION:    1. RCA is a dominant vessel with about 10-20% in-stent restenosis and the  distal lesion which is 95% right before the bifurcation of PDA and PLV. 2.  The PDA has a stent which is patent and a large vessel. 3.  PLV has just mild luminal irregularity and is also patent. 4.  Left main is a large vessel which does not demonstrate a critical lesion.      5.  Circumflex, large vessel which is patent. 6. LAD stent is patent with maybe 10% lesion. 7.  Left ventriculography, ejection fraction 60%. 8.  No complication occurred. The patient is to undergo intervention by Dr. Chey Stover of this high-grade lesion in the distal RCA. Ana Mariscal D.O.  10/18/2016       Conclusions      Procedure Summary   Left main patent   LAD stent 10% ins stent re stenosis   Lcx is patent      RCA with 20% in stent re stenosis   Distal RCA has 95% lesion   RCA/PDA/PLV strents are patent      S/P successful PCI of the distal Right Coronary Artery with 3.5-34 mm drug   eluting stent, post dilated up to 3.6 mm.     D: 10/14/2016      EKG 5/4/18  Sinus  Bradycardia   Low voltage in precordial leads.      ABNORMAL     EKG 11/8/18  NSR, no acute ekg

## 2020-11-03 PROBLEM — I25.10 CORONARY ARTERY DISEASE INVOLVING NATIVE CORONARY ARTERY OF NATIVE HEART: Status: RESOLVED | Noted: 2018-11-08 | Resolved: 2020-11-03

## 2020-12-16 RX ORDER — ROSUVASTATIN CALCIUM 20 MG/1
20 TABLET, COATED ORAL DAILY
Qty: 30 TABLET | Refills: 3 | Status: SHIPPED | OUTPATIENT
Start: 2020-12-16 | End: 2021-04-06 | Stop reason: SDUPTHER

## 2021-03-29 ENCOUNTER — HOSPITAL ENCOUNTER (OUTPATIENT)
Age: 71
Discharge: HOME | End: 2021-03-29
Payer: MEDICARE

## 2021-03-29 ENCOUNTER — OFFICE VISIT (OUTPATIENT)
Dept: CARDIOLOGY CLINIC | Age: 71
End: 2021-03-29
Payer: MEDICARE

## 2021-03-29 ENCOUNTER — TELEPHONE (OUTPATIENT)
Dept: CARDIOLOGY CLINIC | Age: 71
End: 2021-03-29

## 2021-03-29 VITALS
BODY MASS INDEX: 24.65 KG/M2 | DIASTOLIC BLOOD PRESSURE: 62 MMHG | WEIGHT: 144.4 LBS | HEART RATE: 57 BPM | SYSTOLIC BLOOD PRESSURE: 102 MMHG | HEIGHT: 64 IN

## 2021-03-29 DIAGNOSIS — Z95.5 PRESENCE OF STENT IN LAD CORONARY ARTERY: ICD-10-CM

## 2021-03-29 DIAGNOSIS — E78.00 PURE HYPERCHOLESTEROLEMIA: ICD-10-CM

## 2021-03-29 DIAGNOSIS — I25.10 CORONARY ARTERY DISEASE INVOLVING NATIVE CORONARY ARTERY OF NATIVE HEART WITHOUT ANGINA PECTORIS: ICD-10-CM

## 2021-03-29 DIAGNOSIS — I10 ESSENTIAL HYPERTENSION: ICD-10-CM

## 2021-03-29 DIAGNOSIS — I25.10 CORONARY ARTERY DISEASE INVOLVING NATIVE CORONARY ARTERY OF NATIVE HEART WITHOUT ANGINA PECTORIS: Primary | ICD-10-CM

## 2021-03-29 LAB
ALBUMIN SERPL-MCNC: 4.1 G/DL (ref 3.5–5.1)
ALP BLD-CCNC: 72 U/L (ref 38–126)
ALT SERPL-CCNC: 13 U/L (ref 11–66)
AST SERPL-CCNC: 18 U/L (ref 5–40)
BILIRUB SERPL-MCNC: 0.2 MG/DL (ref 0.3–1.2)
BILIRUBIN DIRECT: < 0.2 MG/DL (ref 0–0.3)
CHOLESTEROL, TOTAL: 155 MG/DL (ref 100–199)
HDLC SERPL-MCNC: 50 MG/DL
LDL CHOLESTEROL CALCULATED: 73 MG/DL
TOTAL PROTEIN: 7 G/DL (ref 6.1–8)
TRIGL SERPL-MCNC: 161 MG/DL (ref 0–199)

## 2021-03-29 PROCEDURE — G8427 DOCREV CUR MEDS BY ELIG CLIN: HCPCS | Performed by: INTERNAL MEDICINE

## 2021-03-29 PROCEDURE — 1036F TOBACCO NON-USER: CPT | Performed by: INTERNAL MEDICINE

## 2021-03-29 PROCEDURE — 1090F PRES/ABSN URINE INCON ASSESS: CPT | Performed by: INTERNAL MEDICINE

## 2021-03-29 PROCEDURE — G8420 CALC BMI NORM PARAMETERS: HCPCS | Performed by: INTERNAL MEDICINE

## 2021-03-29 PROCEDURE — 99214 OFFICE O/P EST MOD 30 MIN: CPT | Performed by: INTERNAL MEDICINE

## 2021-03-29 PROCEDURE — 36415 COLL VENOUS BLD VENIPUNCTURE: CPT

## 2021-03-29 PROCEDURE — 80061 LIPID PANEL: CPT

## 2021-03-29 PROCEDURE — 4040F PNEUMOC VAC/ADMIN/RCVD: CPT | Performed by: INTERNAL MEDICINE

## 2021-03-29 PROCEDURE — G8400 PT W/DXA NO RESULTS DOC: HCPCS | Performed by: INTERNAL MEDICINE

## 2021-03-29 PROCEDURE — 80076 HEPATIC FUNCTION PANEL: CPT

## 2021-03-29 PROCEDURE — 1123F ACP DISCUSS/DSCN MKR DOCD: CPT | Performed by: INTERNAL MEDICINE

## 2021-03-29 PROCEDURE — 3017F COLORECTAL CA SCREEN DOC REV: CPT | Performed by: INTERNAL MEDICINE

## 2021-03-29 PROCEDURE — G8484 FLU IMMUNIZE NO ADMIN: HCPCS | Performed by: INTERNAL MEDICINE

## 2021-03-29 RX ORDER — ROSUVASTATIN CALCIUM 20 MG/1
20 TABLET, COATED ORAL DAILY
Qty: 90 TABLET | Refills: 3 | Status: CANCELLED | OUTPATIENT
Start: 2021-03-29

## 2021-03-29 RX ORDER — ISOSORBIDE MONONITRATE 60 MG/1
60 TABLET, EXTENDED RELEASE ORAL DAILY
Qty: 90 TABLET | Refills: 3 | Status: SHIPPED | OUTPATIENT
Start: 2021-03-29 | End: 2022-04-11

## 2021-03-29 NOTE — PROGRESS NOTES
Chief Complaint   Patient presents with    6 Month Follow-Up    Coronary Artery Disease   former pat of Dr. Emely Wilburn  Hx of CAD with multiple stent        6 MONTH FOLLOW UP. EKG DONE 5-18-20.     Pt denies chest pain , heart palpitations, sob, swelling in legs and feet,     Pt continues with dizziness at times,     Hx of atypical cp once in a while, last 5 seconds  Not exertion related  better    Denied  dizziness , palpitation , Edema or SOB    Off statin due to intolerance and now on rrepatha    Record reviewed    Has been cp free for several months    Patient Active Problem List   Diagnosis    Hyperlipidemia    Hypothyroidism    Hypertension    ASHD (arteriosclerotic heart disease)    Presence of stent in LAD coronary artery and RCA 40 to 50% as per cath 2012     S/P cardiac catheterization    Chest pain, atypical    Coronary artery disease involving native coronary artery of native heart without angina pectoris       Past Surgical History:   Procedure Laterality Date    CARDIAC CATHETERIZATION  2012    CARDIOVASCULAR STRESS TEST      COLECTOMY  01/10/2020    COLECTOMY  01/2020    [facility] with Dr. Clem Strickland  11/2009    EYE SURGERY         Allergies   Allergen Reactions    Requip [Ropinirole Hcl]     Statins         Family History   Problem Relation Age of Onset    Heart Disease Mother     Kidney Disease Mother     Heart Disease Father     Cancer Father     Heart Disease Brother     Diabetes Brother         Social History     Socioeconomic History    Marital status:      Spouse name: Not on file    Number of children: Not on file    Years of education: Not on file    Highest education level: Not on file   Occupational History    Occupation:    Social Needs    Financial resource strain: Not on file    Food insecurity     Worry: Not on file     Inability: Not on file    Transportation needs     Medical: Not on file Non-medical: Not on file   Tobacco Use    Smoking status: Passive Smoke Exposure - Never Smoker    Smokeless tobacco: Never Used   Substance and Sexual Activity    Alcohol use: No    Drug use: No    Sexual activity: Not on file   Lifestyle    Physical activity     Days per week: Not on file     Minutes per session: Not on file    Stress: Not on file   Relationships    Social connections     Talks on phone: Not on file     Gets together: Not on file     Attends Muslim service: Not on file     Active member of club or organization: Not on file     Attends meetings of clubs or organizations: Not on file     Relationship status: Not on file    Intimate partner violence     Fear of current or ex partner: Not on file     Emotionally abused: Not on file     Physically abused: Not on file     Forced sexual activity: Not on file   Other Topics Concern    Not on file   Social History Narrative    Not on file       Current Outpatient Medications   Medication Sig Dispense Refill    isosorbide mononitrate (IMDUR) 60 MG extended release tablet Take 1 tablet by mouth daily 90 tablet 3    metoprolol tartrate (LOPRESSOR) 25 MG tablet Take 0.5 tablets by mouth 2 times daily 180 tablet 3    rosuvastatin (CRESTOR) 20 MG tablet TAKE 1 TABLET BY MOUTH DAILY 30 tablet 3    clopidogrel (PLAVIX) 75 MG tablet TAKE 1 TABLET BY MOUTH DAILY AS DIRECTED 90 tablet 3    hydrochlorothiazide (HYDRODIURIL) 12.5 MG tablet Take 12.5 mg by mouth daily       levothyroxine (SYNTHROID) 100 MCG tablet TAKE ONE TABLET BY MOUTH ONCE DAILY ON EMPTY STOMACH IN THE MORNING.       traZODone (DESYREL) 50 MG tablet Take 25 mg by mouth nightly      Multiple Vitamins-Minerals (MULTIVITAL) CHEW Take 1 capsule by mouth daily      citalopram (CELEXA) 40 MG tablet Take 40 mg by mouth daily      aspirin 81 MG EC tablet Take 1 tablet by mouth daily 30 tablet 0    temazepam (RESTORIL) 15 MG capsule Take 15 mg by mouth nightly as needed for Sleep  nitroGLYCERIN (NITROSTAT) 0.4 MG SL tablet Place 1 tablet under the tongue every 5 minutes as needed for Chest pain 25 tablet 3     No current facility-administered medications for this visit. Review of Systems -     General ROS: negative  Psychological ROS: negative  Hematological and Lymphatic ROS: No history of blood clots or bleeding disorder. Respiratory ROS: no cough, shortness of breath, or wheezing  Cardiovascular ROS: see HPI  Gastrointestinal ROS: negative  Genito-Urinary ROS: no dysuria, trouble voiding, or hematuria  Musculoskeletal ROS: negative  Neurological ROS: no TIA or stroke symptoms  Dermatological ROS: negative      Blood pressure 102/62, pulse 57, height 5' 4\" (1.626 m), weight 144 lb 6.4 oz (65.5 kg). Physical Examination:    General appearance - alert, well appearing, and in no distress  Mental status - alert, oriented to person, place, and time  Neck - supple, no significant adenopathy, no JVD, or carotid bruits  Chest - clear to auscultation, no wheezes, rales or rhonchi, symmetric air entry  Heart - normal rate, regular rhythm, normal S1, S2, no murmurs, rubs, clicks or gallops  Abdomen - soft, nontender, nondistended, no masses or organomegaly  Neurological - alert, oriented, normal speech, no focal findings or movement disorder noted  Musculoskeletal - no joint tenderness, deformity or swelling  Extremities - peripheral pulses normal, no pedal edema, no clubbing or cyanosis  Skin - normal coloration and turgor, no rashes, no suspicious skin lesions noted    Lab  No results for input(s): CKTOTAL, CKMB, CKMBINDEX, TROPONINI in the last 72 hours.   CBC:   Lab Results   Component Value Date    WBC 3.6 10/14/2016    RBC 4.00 10/14/2016    HGB 12.5 10/14/2016    HCT 36.9 10/14/2016    MCV 92.3 10/14/2016    MCH 31.1 10/14/2016    MCHC 33.7 10/14/2016    RDW 14.1 10/14/2016     10/14/2016    MPV 8.2 10/14/2016     BMP:    Lab Results   Component Value Date     02/09/2019    K 4.9 02/09/2019     02/09/2019    CO2 105 02/09/2019    BUN 20 02/09/2019    LABALBU 4.0 05/14/2020    CREATININE 1.2 02/09/2019    CALCIUM 9.5 02/09/2019    LABGLOM 45 10/14/2016    GLUCOSE 92 02/09/2019     Hepatic Function Panel:    Lab Results   Component Value Date    ALKPHOS 79 05/14/2020    ALKPHOS 106 02/09/2019    ALT 13 05/14/2020    AST 19 05/14/2020    PROT 7.1 05/14/2020    BILITOT 0.3 05/14/2020    BILIDIR 0.1 05/14/2020    LABALBU 4.0 05/14/2020     Magnesium:  No results found for: MG  Warfarin PT/INR:  No components found for: PTPATWAR, PTINRWAR  HgBA1c:  No results found for: LABA1C  FLP:    Lab Results   Component Value Date    TRIG 191 05/14/2020    HDL 47 05/14/2020    LDLCALC 237 05/14/2020    LABVLDL 38 05/14/2020     TSH:    Lab Results   Component Value Date    TSH 0.08 02/09/2019     CONCLUSION:    1. RCA is a dominant vessel with about 10-20% in-stent restenosis and the  distal lesion which is 95% right before the bifurcation of PDA and PLV. 2.  The PDA has a stent which is patent and a large vessel. 3.  PLV has just mild luminal irregularity and is also patent. 4.  Left main is a large vessel which does not demonstrate a critical lesion.      5.  Circumflex, large vessel which is patent. 6. LAD stent is patent with maybe 10% lesion. 7.  Left ventriculography, ejection fraction 60%. 8.  No complication occurred. The patient is to undergo intervention by Dr. Sharon Serna of this high-grade lesion in the distal RCA.     Kathi Gifford D.O.  10/18/2016       Conclusions      Procedure Summary   Left main patent   LAD stent 10% ins stent re stenosis   Lcx is patent      RCA with 20% in stent re stenosis   Distal RCA has 95% lesion   RCA/PDA/PLV strents are patent      S/P successful PCI of the distal Right Coronary Artery with 3.5-34 mm drug   eluting stent, post dilated up to 3.6 mm.     D: 10/14/2016      EKG 5/4/18  Sinus  Bradycardia   Low voltage in precordial leads.     ABNORMAL     EKG 11/8/18  NSR, no acute ekg abn    ekg   5/18/2020  Sinus  Bradycardia   Low voltage -possible pulmonary disease. ABNORMAL       Assessment   Diagnosis Orders   1. Coronary artery disease involving native coronary artery of native heart without angina pectoris  Lipid Panel    Hepatic Function Panel   2. Pure hypercholesterolemia  Lipid Panel    Hepatic Function Panel   3. Essential hypertension  Lipid Panel    Hepatic Function Panel   4. Presence of stent in LAD coronary artery and RCA 40 to 50% as per cath 2012   Lipid Panel    Hepatic Function Panel       Plan   The   MOST current meds and labs reviewed    Continue the current treatment and with constant vigilance to changes in symptoms and also any potential side effects. Return for care or seek medical attention immediately if symptoms got worse and/or develop new symptoms. Coronary artery disease, . Denies  change in breathing pattern  Some atypical cp once in a while , few seconds  Imdur 60 mg po qd  Echo and lexisc nuc- WNL  Cont asa and plavix      Hypertension, on medical treatment. Seems to be under good control. Patient is compliant with medical treatment. Cont  HCTZ 12.5 mg po qd   No dizziness     Hyperlipidemia: on statins, followed periodically. Patient need periodic lipid and liver profile. Hyperlipidemia: pat off  Statins, pravastatin, Lipitor for myalgia  HLP  Off statin   , 126 then 237  Could not afford PCS K9 repatha  readvised  To be on meds  TOLERATING  crestor 20 mg po qd  coex Q 10  NEED lp AND lft ASAP  Addendum  Lab reviewed  LDL went down to 73 from 237 after crestor  Cont crestor 20 mg po qd    Lipid panel and liver function test before next appointment    Reviewed the records    D/w the pat the plan of care  D/w the need of treating HIgh LDL    Discussed use, benefit, and side effects of prescribed medications. All patient questions answered. Pt voiced understanding.  Instructed to continue

## 2021-03-30 RX ORDER — ROSUVASTATIN CALCIUM 20 MG/1
20 TABLET, COATED ORAL DAILY
Qty: 90 TABLET | Refills: 2 | Status: CANCELLED | OUTPATIENT
Start: 2021-03-30

## 2021-04-06 RX ORDER — ROSUVASTATIN CALCIUM 20 MG/1
20 TABLET, COATED ORAL DAILY
Qty: 30 TABLET | Refills: 4 | Status: SHIPPED | OUTPATIENT
Start: 2021-04-06 | End: 2021-05-03

## 2021-05-03 RX ORDER — ROSUVASTATIN CALCIUM 20 MG/1
20 TABLET, COATED ORAL DAILY
Qty: 30 TABLET | Refills: 3 | Status: SHIPPED | OUTPATIENT
Start: 2021-05-03 | End: 2021-09-24

## 2021-09-24 RX ORDER — ROSUVASTATIN CALCIUM 20 MG/1
20 TABLET, COATED ORAL DAILY
Qty: 90 TABLET | Refills: 0 | Status: SHIPPED | OUTPATIENT
Start: 2021-09-24 | End: 2021-11-29

## 2021-09-24 RX ORDER — CLOPIDOGREL BISULFATE 75 MG/1
TABLET ORAL
Qty: 90 TABLET | Refills: 0 | Status: SHIPPED | OUTPATIENT
Start: 2021-09-24 | End: 2021-11-29

## 2021-09-29 ENCOUNTER — OFFICE VISIT (OUTPATIENT)
Dept: CARDIOLOGY CLINIC | Age: 71
End: 2021-09-29
Payer: MEDICARE

## 2021-09-29 VITALS
HEIGHT: 64 IN | SYSTOLIC BLOOD PRESSURE: 100 MMHG | DIASTOLIC BLOOD PRESSURE: 61 MMHG | BODY MASS INDEX: 24.92 KG/M2 | WEIGHT: 146 LBS | HEART RATE: 60 BPM

## 2021-09-29 DIAGNOSIS — R06.09 DOE (DYSPNEA ON EXERTION): ICD-10-CM

## 2021-09-29 DIAGNOSIS — Z95.5 PRESENCE OF STENT IN LAD CORONARY ARTERY: ICD-10-CM

## 2021-09-29 DIAGNOSIS — E78.00 PURE HYPERCHOLESTEROLEMIA: ICD-10-CM

## 2021-09-29 DIAGNOSIS — R94.31 ABNORMAL EKG: ICD-10-CM

## 2021-09-29 DIAGNOSIS — I25.10 CORONARY ARTERY DISEASE INVOLVING NATIVE CORONARY ARTERY OF NATIVE HEART WITHOUT ANGINA PECTORIS: Primary | ICD-10-CM

## 2021-09-29 DIAGNOSIS — I10 ESSENTIAL HYPERTENSION: ICD-10-CM

## 2021-09-29 PROBLEM — R07.89 CHEST PAIN, ATYPICAL: Status: RESOLVED | Noted: 2020-05-18 | Resolved: 2021-09-29

## 2021-09-29 PROCEDURE — 1123F ACP DISCUSS/DSCN MKR DOCD: CPT | Performed by: INTERNAL MEDICINE

## 2021-09-29 PROCEDURE — 1036F TOBACCO NON-USER: CPT | Performed by: INTERNAL MEDICINE

## 2021-09-29 PROCEDURE — 1090F PRES/ABSN URINE INCON ASSESS: CPT | Performed by: INTERNAL MEDICINE

## 2021-09-29 PROCEDURE — G8417 CALC BMI ABV UP PARAM F/U: HCPCS | Performed by: INTERNAL MEDICINE

## 2021-09-29 PROCEDURE — 4040F PNEUMOC VAC/ADMIN/RCVD: CPT | Performed by: INTERNAL MEDICINE

## 2021-09-29 PROCEDURE — 3017F COLORECTAL CA SCREEN DOC REV: CPT | Performed by: INTERNAL MEDICINE

## 2021-09-29 PROCEDURE — 93000 ELECTROCARDIOGRAM COMPLETE: CPT | Performed by: INTERNAL MEDICINE

## 2021-09-29 PROCEDURE — G8427 DOCREV CUR MEDS BY ELIG CLIN: HCPCS | Performed by: INTERNAL MEDICINE

## 2021-09-29 PROCEDURE — 99214 OFFICE O/P EST MOD 30 MIN: CPT | Performed by: INTERNAL MEDICINE

## 2021-09-29 PROCEDURE — G8400 PT W/DXA NO RESULTS DOC: HCPCS | Performed by: INTERNAL MEDICINE

## 2021-09-29 NOTE — PROGRESS NOTES
Chief Complaint   Patient presents with    6 Month Follow-Up     CAD    former pat of Dr. Saroj Virgen  Hx of CAD with multiple stent       6 month f/u    Pt denies chest pain , dizziness, heart palpitations, or edema    Sob on exertion on stairs-chronic    Off statin due to intolerance and now on rrepatha    Record reviewed    Has been cp free for several months    Patient Active Problem List   Diagnosis    Hyperlipidemia    Hypothyroidism    Hypertension    ASHD (arteriosclerotic heart disease)    Presence of stent in LAD coronary artery and RCA 40 to 50% as per cath 2012     S/P cardiac catheterization    Coronary artery disease involving native coronary artery of native heart without angina pectoris    Abnormal EKG    LEVI (dyspnea on exertion) ONLY FLIGHT OF STAIRS FOR YRS       Past Surgical History:   Procedure Laterality Date    CARDIAC CATHETERIZATION  2012    CARDIOVASCULAR STRESS TEST      COLECTOMY  01/10/2020    COLECTOMY  01/2020    [facility] with Dr. Brandi Kang  11/2009    EYE SURGERY         Allergies   Allergen Reactions    Requip [Ropinirole Hcl]     Statins         Family History   Problem Relation Age of Onset    Heart Disease Mother     Kidney Disease Mother     Heart Disease Father     Cancer Father     Heart Disease Brother     Diabetes Brother         Social History     Socioeconomic History    Marital status:      Spouse name: Not on file    Number of children: Not on file    Years of education: Not on file    Highest education level: Not on file   Occupational History    Occupation:    Tobacco Use    Smoking status: Passive Smoke Exposure - Never Smoker    Smokeless tobacco: Never Used   Vaping Use    Vaping Use: Never used   Substance and Sexual Activity    Alcohol use: No    Drug use: No    Sexual activity: Not on file   Other Topics Concern    Not on file   Social History Narrative    Not on file     Social Determinants of Health     Financial Resource Strain:     Difficulty of Paying Living Expenses:    Food Insecurity:     Worried About Running Out of Food in the Last Year:     [address] in the Last Year:    Transportation Needs:     Lack of Transportation (Medical):  Lack of Transportation (Non-Medical):    Physical Activity:     Days of Exercise per Week:     Minutes of Exercise per Session:    Stress:     Feeling of Stress :    Social Connections:     Frequency of Communication with Friends and Family:     Frequency of Social Gatherings with Friends and Family:     Attends [employer] Services:     Active Member of Clubs or Organizations:     Attends Club or Organization Meetings:     Marital Status:    Intimate Partner Violence:     Fear of Current or Ex-Partner:     Emotionally Abused:     Physically Abused:     Sexually Abused:        Current Outpatient Medications   Medication Sig Dispense Refill    rosuvastatin (CRESTOR) 20 MG tablet TAKE 1 TABLET BY MOUTH DAILY 90 tablet 0    clopidogrel (PLAVIX) 75 MG tablet TAKE 1 TABLET BY MOUTH DAILY AS DIRECTED 90 tablet 0    isosorbide mononitrate (IMDUR) 60 MG extended release tablet Take 1 tablet by mouth daily 90 tablet 3    metoprolol tartrate (LOPRESSOR) 25 MG tablet Take 0.5 tablets by mouth 2 times daily 180 tablet 3    hydrochlorothiazide (HYDRODIURIL) 12.5 MG tablet Take 12.5 mg by mouth daily       levothyroxine (SYNTHROID) 100 MCG tablet TAKE ONE TABLET BY MOUTH ONCE DAILY ON EMPTY STOMACH IN THE MORNING.       traZODone (DESYREL) 50 MG tablet Take 25 mg by mouth nightly      Multiple Vitamins-Minerals (MULTIVITAL) CHEW Take 1 capsule by mouth daily      citalopram (CELEXA) 40 MG tablet Take 40 mg by mouth daily      aspirin 81 MG EC tablet Take 1 tablet by mouth daily 30 tablet 0    temazepam (RESTORIL) 15 MG capsule Take 15 mg by mouth nightly as needed for Sleep      nitroGLYCERIN (NITROSTAT) 0.4 MG SL tablet Place 1 tablet under the tongue every 5 minutes as needed for Chest pain 25 tablet 3     No current facility-administered medications for this visit. Review of Systems -     General ROS: negative  Psychological ROS: negative  Hematological and Lymphatic ROS: No history of blood clots or bleeding disorder. Respiratory ROS: no cough, shortness of breath, or wheezing  Cardiovascular ROS: see HPI  Gastrointestinal ROS: negative  Genito-Urinary ROS: no dysuria, trouble voiding, or hematuria  Musculoskeletal ROS: negative  Neurological ROS: no TIA or stroke symptoms  Dermatological ROS: negative      Blood pressure 100/61, pulse 60, height 5' 4\" (1.626 m), weight 146 lb (66.2 kg). Physical Examination:    General appearance - alert, well appearing, and in no distress  Mental status - alert, oriented to person, place, and time  Neck - supple, no significant adenopathy, no JVD, or carotid bruits  Chest - clear to auscultation, no wheezes, rales or rhonchi, symmetric air entry  Heart - normal rate, regular rhythm, normal S1, S2, no murmurs, rubs, clicks or gallops  Abdomen - soft, nontender, nondistended, no masses or organomegaly  Neurological - alert, oriented, normal speech, no focal findings or movement disorder noted  Musculoskeletal - no joint tenderness, deformity or swelling  Extremities - peripheral pulses normal, no pedal edema, no clubbing or cyanosis  Skin - normal coloration and turgor, no rashes, no suspicious skin lesions noted    Lab  No results for input(s): CKTOTAL, CKMB, CKMBINDEX, TROPONINI in the last 72 hours.   CBC:   Lab Results   Component Value Date    WBC 3.6 10/14/2016    RBC 4.00 10/14/2016    HGB 12.5 10/14/2016    HCT 36.9 10/14/2016    MCV 92.3 10/14/2016    MCH 31.1 10/14/2016    MCHC 33.7 10/14/2016    RDW 14.1 10/14/2016     10/14/2016    MPV 8.2 10/14/2016     BMP:    Lab Results   Component Value Date     02/09/2019    K 4.9 02/09/2019     5/18/2020  Sinus  Bradycardia   Low voltage -possible pulmonary disease. ABNORMAL     ekg 9/29/2021  Sinus  Bradycardia   Low voltage in precordial leads. -PRWP  ABNORMAL         Assessment   Diagnosis Orders   1. Coronary artery disease involving native coronary artery of native heart without angina pectoris  ECHO Complete 2D W Doppler W Color   2. Presence of stent in LAD coronary artery and RCA 40 to 50% as per cath 2012   EKG 12 Lead    ECHO Complete 2D W Doppler W Color   3. Pure hypercholesterolemia  ECHO Complete 2D W Doppler W Color   4. Essential hypertension  ECHO Complete 2D W Doppler W Color   5. NULL (dyspnea on exertion) ONLY FLIGHT OF STAIRS FOR YRS     6. Abnormal EKG  ECHO Complete 2D W Doppler W Color       Plan   The  current meds and labs reviewed    Continue the current treatment and with constant vigilance to changes in symptoms and also any potential side effects. Return for care or seek medical attention immediately if symptoms got worse and/or develop new symptoms. Coronary artery disease, seems to be stable. Denies angina or change in breathing pattern  Cont lopressor and Imdur 60 mg po qd  Echo and lexisc nuc- WNL  Cont asa and plavix  Null ON FLIGHT OF STAIRS FOR YRS AND STABLE  Echo for abn ekg and stability of EF      Hypertension, on medical treatment. Seems to be under good control. Patient is compliant with medical treatment. Cont  HCTZ 12.5 mg po qd   No dizziness     Hyperlipidemia: on statins, followed periodically. Patient need periodic lipid and liver profile.   Hyperlipidemia: pat off  Statins, pravastatin, Lipitor for myalgia  HLP  Off statin   , 126 then 237 and then 73  Could not afford PCS K9 repatha  readvised  To be on meds  TOLERATING  crestor 20 mg po qd  coex Q 10  NEED lp AND lft ASAP  Addendum  Lab reviewed  LDL went down to 73 from 237 after crestor  Cont crestor 20 mg po qd    Lipid panel and liver function test before next appointment    Reviewed the records    D/w the pat the plan of care    Angeles Cook    Discussed use, benefit, and side effects of prescribed medications. All patient questions answered. Pt voiced understanding. Instructed to continue current medications, diet and exercise. Continue risk factor modification and medical management. Patient agreed with treatment plan. Follow up as directed.     RTC in 6 months      [facility]Th[facility]

## 2021-10-18 ENCOUNTER — TELEPHONE (OUTPATIENT)
Dept: CARDIOLOGY CLINIC | Age: 71
End: 2021-10-18

## 2021-10-18 DIAGNOSIS — R06.09 DOE (DYSPNEA ON EXERTION): ICD-10-CM

## 2021-10-18 DIAGNOSIS — I25.10 CORONARY ARTERY DISEASE INVOLVING NATIVE CORONARY ARTERY OF NATIVE HEART WITHOUT ANGINA PECTORIS: Primary | ICD-10-CM

## 2021-10-18 DIAGNOSIS — R07.9 CHEST PAIN, UNSPECIFIED TYPE: ICD-10-CM

## 2021-10-18 NOTE — TELEPHONE ENCOUNTER
Pt called saying she has an echo ramon for 10/27    She was in [facility] yesterday and had an episode of chest pain and went home and took a nap because she was very fatigued     Pt called to make sure Dr. Aspen Angulo didn't want to order anything else

## 2021-10-20 NOTE — TELEPHONE ENCOUNTER
Stress test scheduled to follow echo 10-27-21  Pt informed, date, time and instructions reviewed and mailed to pt  Informed pt again to go to ER if has chest pain

## 2021-10-27 ENCOUNTER — HOSPITAL ENCOUNTER (OUTPATIENT)
Dept: NON INVASIVE DIAGNOSTICS | Age: 71
Discharge: HOME | End: 2021-10-27
Payer: MEDICARE

## 2021-10-27 DIAGNOSIS — R94.31 ABNORMAL EKG: ICD-10-CM

## 2021-10-27 DIAGNOSIS — E78.00 PURE HYPERCHOLESTEROLEMIA: ICD-10-CM

## 2021-10-27 DIAGNOSIS — Z95.5 PRESENCE OF STENT IN LAD CORONARY ARTERY: ICD-10-CM

## 2021-10-27 DIAGNOSIS — I25.10 CORONARY ARTERY DISEASE INVOLVING NATIVE CORONARY ARTERY OF NATIVE HEART WITHOUT ANGINA PECTORIS: ICD-10-CM

## 2021-10-27 DIAGNOSIS — R07.9 CHEST PAIN, UNSPECIFIED TYPE: ICD-10-CM

## 2021-10-27 DIAGNOSIS — R06.09 DOE (DYSPNEA ON EXERTION): ICD-10-CM

## 2021-10-27 DIAGNOSIS — I10 ESSENTIAL HYPERTENSION: ICD-10-CM

## 2021-10-27 LAB
LV EF: 65 %
LVEF MODALITY: NORMAL

## 2021-10-27 PROCEDURE — 3430000000 HC RX DIAGNOSTIC RADIOPHARMACEUTICAL: Performed by: INTERNAL MEDICINE

## 2021-10-27 PROCEDURE — 93017 CV STRESS TEST TRACING ONLY: CPT | Performed by: INTERNAL MEDICINE

## 2021-10-27 PROCEDURE — 6360000002 HC RX W HCPCS

## 2021-10-27 PROCEDURE — A9500 TC99M SESTAMIBI: HCPCS | Performed by: INTERNAL MEDICINE

## 2021-10-27 PROCEDURE — 78452 HT MUSCLE IMAGE SPECT MULT: CPT

## 2021-10-27 PROCEDURE — 93306 TTE W/DOPPLER COMPLETE: CPT

## 2021-10-27 RX ADMIN — Medication 30.5 MILLICURIE: at 11:10

## 2021-10-27 RX ADMIN — Medication 9.3 MILLICURIE: at 10:20

## 2021-11-29 RX ORDER — ROSUVASTATIN CALCIUM 20 MG/1
20 TABLET, COATED ORAL DAILY
Qty: 90 TABLET | Refills: 1 | Status: SHIPPED | OUTPATIENT
Start: 2021-11-29 | End: 2022-03-29

## 2021-11-29 RX ORDER — CLOPIDOGREL BISULFATE 75 MG/1
TABLET ORAL
Qty: 90 TABLET | Refills: 1 | Status: SHIPPED | OUTPATIENT
Start: 2021-11-29 | End: 2022-04-11

## 2021-11-29 RX ORDER — NITROGLYCERIN 0.4 MG/1
0.4 TABLET SUBLINGUAL EVERY 5 MIN PRN
Qty: 25 TABLET | Refills: 3 | Status: SHIPPED | OUTPATIENT
Start: 2021-11-29

## 2021-11-29 NOTE — TELEPHONE ENCOUNTER
Lisa Negron called requesting a refill on the following medications:  Requested Prescriptions     Pending Prescriptions Disp Refills    nitroGLYCERIN (NITROSTAT) 0.4 MG SL tablet 25 tablet 3     Sig: Place 1 tablet under the tongue every 5 minutes as needed for Chest pain     Pharmacy verified: [facility]  . pv      Date of last visit: 9/29/2021  Date of next visit (if applicable): 6/58/2505

## 2022-02-23 LAB
ALBUMIN SERPL-MCNC: 4.1 G/DL (ref 3.2–5.3)
ALK PHOSPHATASE: 69 U/L (ref 39–130)
ALT SERPL-CCNC: 18 U/L (ref 0–31)
AST SERPL-CCNC: 20 U/L (ref 0–41)
BILIRUB SERPL-MCNC: 0.4 MG/DL (ref 0.3–1.2)
BILIRUBIN DIRECT: 0.1 MG/DL (ref 0–0.4)
CHOLESTEROL/HDL RATIO: 3.4 (ref 1–5)
CHOLESTEROL: 183 MG/DL (ref 150–200)
HDLC SERPL-MCNC: 54 MG/DL
LDL CHOLESTEROL CALCULATED: 104 MG/DL
LDL/HDL RATIO: 1.9
TOTAL PROTEIN: 6.9 G/DL (ref 6–8)
TRIGL SERPL-MCNC: 123 MG/DL (ref 27–150)
VLDLC SERPL CALC-MCNC: 25 MG/DL (ref 0–30)

## 2022-03-29 ENCOUNTER — OFFICE VISIT (OUTPATIENT)
Dept: CARDIOLOGY CLINIC | Age: 72
End: 2022-03-29
Payer: MEDICARE

## 2022-03-29 VITALS
BODY MASS INDEX: 25.2 KG/M2 | SYSTOLIC BLOOD PRESSURE: 98 MMHG | HEIGHT: 64 IN | WEIGHT: 147.6 LBS | HEART RATE: 60 BPM | DIASTOLIC BLOOD PRESSURE: 62 MMHG

## 2022-03-29 DIAGNOSIS — I25.10 CORONARY ARTERY DISEASE INVOLVING NATIVE CORONARY ARTERY OF NATIVE HEART WITHOUT ANGINA PECTORIS: Primary | ICD-10-CM

## 2022-03-29 DIAGNOSIS — Z95.5 PRESENCE OF STENT IN LAD CORONARY ARTERY: ICD-10-CM

## 2022-03-29 DIAGNOSIS — I10 PRIMARY HYPERTENSION: ICD-10-CM

## 2022-03-29 DIAGNOSIS — E78.00 PURE HYPERCHOLESTEROLEMIA: ICD-10-CM

## 2022-03-29 DIAGNOSIS — R94.31 ABNORMAL EKG: ICD-10-CM

## 2022-03-29 PROCEDURE — G8417 CALC BMI ABV UP PARAM F/U: HCPCS | Performed by: INTERNAL MEDICINE

## 2022-03-29 PROCEDURE — 1036F TOBACCO NON-USER: CPT | Performed by: INTERNAL MEDICINE

## 2022-03-29 PROCEDURE — 4040F PNEUMOC VAC/ADMIN/RCVD: CPT | Performed by: INTERNAL MEDICINE

## 2022-03-29 PROCEDURE — G8484 FLU IMMUNIZE NO ADMIN: HCPCS | Performed by: INTERNAL MEDICINE

## 2022-03-29 PROCEDURE — 99214 OFFICE O/P EST MOD 30 MIN: CPT | Performed by: INTERNAL MEDICINE

## 2022-03-29 PROCEDURE — 1123F ACP DISCUSS/DSCN MKR DOCD: CPT | Performed by: INTERNAL MEDICINE

## 2022-03-29 PROCEDURE — 1090F PRES/ABSN URINE INCON ASSESS: CPT | Performed by: INTERNAL MEDICINE

## 2022-03-29 PROCEDURE — 3017F COLORECTAL CA SCREEN DOC REV: CPT | Performed by: INTERNAL MEDICINE

## 2022-03-29 PROCEDURE — G8400 PT W/DXA NO RESULTS DOC: HCPCS | Performed by: INTERNAL MEDICINE

## 2022-03-29 PROCEDURE — G8427 DOCREV CUR MEDS BY ELIG CLIN: HCPCS | Performed by: INTERNAL MEDICINE

## 2022-03-29 RX ORDER — ALENDRONATE SODIUM 70 MG/1
70 TABLET ORAL
COMMUNITY
Start: 2022-03-02

## 2022-03-29 RX ORDER — ROSUVASTATIN CALCIUM 40 MG/1
40 TABLET, COATED ORAL DAILY
Qty: 90 TABLET | Refills: 3 | Status: SHIPPED | OUTPATIENT
Start: 2022-03-29 | End: 2022-05-25

## 2022-03-29 NOTE — PROGRESS NOTES
Chief Complaint   Patient presents with    6 Month Follow-Up    Coronary Artery Disease   former pat of Dr. Cyndy Doll  Hx of CAD with multiple stent        6 month follow up. EKG done 9-.     Pt denies chest pain , dizziness, heart palpitations, or edema    Sob on exertion on stairs-chronic    Off statin due to intolerance and now on rrepatha    Record reviewed    Has been cp free for several months    Patient Active Problem List   Diagnosis    Hyperlipidemia    Hypothyroidism    Hypertension    ASHD (arteriosclerotic heart disease)    Presence of stent in LAD coronary artery and RCA 40 to 50% as per cath 2012     S/P cardiac catheterization    Coronary artery disease involving native coronary artery of native heart without angina pectoris    Abnormal EKG    LEVI (dyspnea on exertion) ONLY FLIGHT OF STAIRS FOR YRS       Past Surgical History:   Procedure Laterality Date    CARDIAC CATHETERIZATION  2012    CARDIOVASCULAR STRESS TEST      COLECTOMY  01/10/2020    COLECTOMY  01/2020    [facility] with Dr. Derick Agee  11/2009    EYE SURGERY         Allergies   Allergen Reactions    Requip [Ropinirole Hcl]     Statins         Family History   Problem Relation Age of Onset    Heart Disease Mother     Kidney Disease Mother     Heart Disease Father     Cancer Father     Heart Disease Brother     Diabetes Brother         Social History     Socioeconomic History    Marital status:      Spouse name: Not on file    Number of children: Not on file    Years of education: Not on file    Highest education level: Not on file   Occupational History    Occupation:    Tobacco Use    Smoking status: Passive Smoke Exposure - Never Smoker    Smokeless tobacco: Never Used   Vaping Use    Vaping Use: Never used   Substance and Sexual Activity    Alcohol use: No    Drug use: No    Sexual activity: Not on file   Other Topics Concern  Not on file   Social History Narrative    Not on file     Social Determinants of Health     Financial Resource Strain:     Difficulty of Paying Living Expenses: Not on file   Food Insecurity:     Worried About Running Out of Food in the Last Year: Not on file    Tao of Food in the Last Year: Not on file   Transportation Needs:     Lack of Transportation (Medical): Not on file    Lack of Transportation (Non-Medical):  Not on file   Physical Activity:     Days of Exercise per Week: Not on file    Minutes of Exercise per Session: Not on file   Stress:     Feeling of Stress : Not on file   Social Connections:     Frequency of Communication with Friends and Family: Not on file    Frequency of Social Gatherings with Friends and Family: Not on file    Attends Restoration Services: Not on file    Active Member of [address] [place] [address] [employer] or Organizations: Not on file    Attends Club or Organization Meetings: Not on file    Marital Status: Not on file   Intimate Partner Violence:     Fear of Current or Ex-Partner: Not on file    Emotionally Abused: Not on file    Physically Abused: Not on file    Sexually Abused: Not on file   Housing Stability:     Unable to Pay for Housing in the Last Year: Not on file    Number of [place] in the Last Year: Not on file    Unstable Housing in the Last Year: Not on file       Current Outpatient Medications   Medication Sig Dispense Refill    alendronate (FOSAMAX) 70 MG tablet Take 70 mg by mouth every 7 days       rosuvastatin (CRESTOR) 40 MG tablet Take 1 tablet by mouth daily 90 tablet 3    clopidogrel (PLAVIX) 75 MG tablet TAKE 1 TABLET BY MOUTH DAILY AS DIRECTED 90 tablet 1    nitroGLYCERIN (NITROSTAT) 0.4 MG SL tablet Place 1 tablet under the tongue every 5 minutes as needed for Chest pain 25 tablet 3    isosorbide mononitrate (IMDUR) 60 MG extended release tablet Take 1 tablet by mouth daily 90 tablet 3    metoprolol tartrate (LOPRESSOR) 25 MG tablet Take 0.5 tablets by mouth 2 times daily 180 tablet 3    levothyroxine (SYNTHROID) 100 MCG tablet 88 mcg       traZODone (DESYREL) 50 MG tablet Take 25 mg by mouth nightly      Multiple Vitamins-Minerals (MULTIVITAL) CHEW Take 1 capsule by mouth daily      citalopram (CELEXA) 40 MG tablet Take 40 mg by mouth daily      aspirin 81 MG EC tablet Take 1 tablet by mouth daily 30 tablet 0    temazepam (RESTORIL) 15 MG capsule Take 15 mg by mouth nightly as needed for Sleep       No current facility-administered medications for this visit. Review of Systems -     General ROS: negative  Psychological ROS: negative  Hematological and Lymphatic ROS: No history of blood clots or bleeding disorder. Respiratory ROS: no cough, shortness of breath, or wheezing  Cardiovascular ROS: see HPI  Gastrointestinal ROS: negative  Genito-Urinary ROS: no dysuria, trouble voiding, or hematuria  Musculoskeletal ROS: negative  Neurological ROS: no TIA or stroke symptoms  Dermatological ROS: negative      Blood pressure 98/62, pulse 60, height 5' 4\" (1.626 m), weight 147 lb 9.6 oz (67 kg).         Physical Examination:    General appearance - alert, well appearing, and in no distress  Mental status - alert, oriented to person, place, and time  Neck - supple, no significant adenopathy, no JVD, or carotid bruits  Chest - clear to auscultation, no wheezes, rales or rhonchi, symmetric air entry  Heart - normal rate, regular rhythm, normal S1, S2, no murmurs, rubs, clicks or gallops  Abdomen - soft, nontender, nondistended, no masses or organomegaly  Neurological - alert, oriented, normal speech, no focal findings or movement disorder noted  Musculoskeletal - no joint tenderness, deformity or swelling  Extremities - peripheral pulses normal, no pedal edema, no clubbing or cyanosis  Skin - normal coloration and turgor, no rashes, no suspicious skin lesions noted    Lab  No results for input(s): CKTOTAL, CKMB, CKMBINDEX, TROPONINI in the last 72 hours.  CBC:   Lab Results   Component Value Date    WBC 3.6 10/14/2016    RBC 4.00 10/14/2016    HGB 12.5 10/14/2016    HCT 36.9 10/14/2016    MCV 92.3 10/14/2016    MCH 31.1 10/14/2016    MCHC 33.7 10/14/2016    RDW 14.1 10/14/2016     10/14/2016    MPV 8.2 10/14/2016     BMP:    Lab Results   Component Value Date     02/09/2019    K 4.9 02/09/2019     02/09/2019    CO2 105 02/09/2019    BUN 20 02/09/2019    LABALBU 4.1 02/22/2022    CREATININE 1.2 02/09/2019    CALCIUM 9.5 02/09/2019    LABGLOM 45 10/14/2016    GLUCOSE 92 02/09/2019     Hepatic Function Panel:    Lab Results   Component Value Date    ALKPHOS 69 02/22/2022    ALKPHOS 72 03/29/2021    ALT 18 02/22/2022    AST 20 02/22/2022    PROT 6.9 02/22/2022    BILITOT 0.4 02/22/2022    BILIDIR 0.1 02/22/2022    LABALBU 4.1 02/22/2022     Magnesium:  No results found for: MG  Warfarin PT/INR:  No components found for: PTPATWAR, PTINRWAR  HgBA1c:  No results found for: LABA1C  FLP:    Lab Results   Component Value Date    TRIG 123 02/22/2022    HDL 54 02/22/2022    LDLCALC 104 02/22/2022    LABVLDL 25 02/22/2022     TSH:    Lab Results   Component Value Date    TSH 0.08 02/09/2019     CONCLUSION:    1. RCA is a dominant vessel with about 10-20% in-stent restenosis and the  distal lesion which is 95% right before the bifurcation of PDA and PLV. 2.  The PDA has a stent which is patent and a large vessel. 3.  PLV has just mild luminal irregularity and is also patent. 4.  Left main is a large vessel which does not demonstrate a critical lesion.      5.  Circumflex, large vessel which is patent. 6. LAD stent is patent with maybe 10% lesion. 7.  Left ventriculography, ejection fraction 60%. 8.  No complication occurred. The patient is to undergo intervention by Dr. Suh Spurling of this high-grade lesion in the distal RCA.     Gerry Langston D.O.  10/18/2016       Conclusions      Procedure Summary   Left main patent   LAD stent 10% ins stent re stenosis   Lcx is patent      RCA with 20% in stent re stenosis   Distal RCA has 95% lesion   RCA/PDA/PLV strents are patent      S/P successful PCI of the distal Right Coronary Artery with 3.5-34 mm drug   eluting stent, post dilated up to 3.6 mm.     D: 10/14/2016      EKG 5/4/18  Sinus  Bradycardia   Low voltage in precordial leads. ABNORMAL     EKG 11/8/18  NSR, no acute ekg abn    ekg   5/18/2020  Sinus  Bradycardia   Low voltage -possible pulmonary disease. ABNORMAL     ekg 9/29/2021  Sinus  Bradycardia   Low voltage in precordial leads. -PRWP  ABNORMAL         Assessment   Diagnosis Orders   1. Coronary artery disease involving native coronary artery of native heart without angina pectoris     2. Pure hypercholesterolemia     3. Primary hypertension     4. Presence of stent in LAD coronary artery and RCA 40 to 50% as per cath 2012      5. Abnormal EKG         Plan   The   Most current meds and labs reviewed    Continue the current treatment and with constant vigilance to changes in symptoms and also any potential side effects. Return for care or seek medical attention immediately if symptoms got worse and/or develop new symptoms. Coronary artery disease, seems to be stable. Denies angina or change in breathing pattern  Cont lopressor and Imdur 60 mg po qd  Echo and lexisc nuc- WNL  Cont asa and plavix  Null ON FLIGHT OF STAIRS FOR YRS AND STABLE  Echo for abn ekg and stability of EF      Hypertension, on medical treatment. Seems to be under good control. Patient is compliant with medical treatment. Los normal BP  Stop  HCTZ 12.5 mg po qd   No dizziness     Hyperlipidemia: on statins, followed periodically. Patient need periodic lipid and liver profile.   Hyperlipidemia: pat off  Statins, pravastatin, Lipitor for myalgia  HLP  Off statin   , 126 then 237 and then 73  Could not afford PCS K9 repatha  readvised  To be on meds  TOLERATING  crestor 20 mg po qd  LDL went down to 73 from 237 after crestor  Increase crestor  40 mg po qd from 20 for better control    Lipid panel and liver function test before next appointment    Reviewed the records    D/w the pat the plan of care    Overall STABLE  AND DOING WELL    Discussed use, benefit, and side effects of prescribed medications. All patient questions answered. Pt voiced understanding. Instructed to continue current medications, diet and exercise. Continue risk factor modification and medical management. Patient agreed with treatment plan. Follow up as directed.     RTC in 6 months      Ricardo Joseph [facility]

## 2022-04-11 RX ORDER — ROSUVASTATIN CALCIUM 20 MG/1
20 TABLET, COATED ORAL DAILY
Qty: 90 TABLET | Refills: 1 | Status: SHIPPED | OUTPATIENT
Start: 2022-04-11

## 2022-04-11 RX ORDER — CLOPIDOGREL BISULFATE 75 MG/1
TABLET ORAL
Qty: 90 TABLET | Refills: 1 | Status: SHIPPED | OUTPATIENT
Start: 2022-04-11

## 2022-04-11 RX ORDER — ISOSORBIDE MONONITRATE 60 MG/1
60 TABLET, EXTENDED RELEASE ORAL DAILY
Qty: 90 TABLET | Refills: 1 | Status: SHIPPED | OUTPATIENT
Start: 2022-04-11 | End: 2022-05-25

## 2022-05-23 NOTE — PROGRESS NOTES
alendronate (FOSAMAX) 70 MG tablet Take 70 mg by mouth every 7 days       nitroGLYCERIN (NITROSTAT) 0.4 MG SL tablet Place 1 tablet under the tongue every 5 minutes as needed for Chest pain 25 tablet 3    metoprolol tartrate (LOPRESSOR) 25 MG tablet Take 0.5 tablets by mouth 2 times daily 180 tablet 3    levothyroxine (SYNTHROID) 100 MCG tablet 88 mcg       traZODone (DESYREL) 50 MG tablet Take 25 mg by mouth nightly      citalopram (CELEXA) 40 MG tablet Take 40 mg by mouth daily      aspirin 81 MG EC tablet Take 1 tablet by mouth daily 30 tablet 0    temazepam (RESTORIL) 15 MG capsule Take 15 mg by mouth nightly as needed for Sleep       No current facility-administered medications for this visit. Social History     Socioeconomic History    Marital status:      Spouse name: None    Number of children: None    Years of education: None    Highest education level: None   Occupational History    Occupation:    Tobacco Use    Smoking status: Passive Smoke Exposure - Never Smoker    Smokeless tobacco: Never Used   Vaping Use    Vaping Use: Never used   Substance and Sexual Activity    Alcohol use: No    Drug use: No    Sexual activity: None   Other Topics Concern    None   Social History Narrative    None     Social Determinants of Health     Financial Resource Strain:     Difficulty of Paying Living Expenses: Not on file   Food Insecurity:     Worried About Running Out of Food in the Last Year: Not on file    Tao of Food in the Last Year: Not on file   Transportation Needs:     Lack of Transportation (Medical): Not on file    Lack of Transportation (Non-Medical):  Not on file   Physical Activity:     Days of Exercise per Week: Not on file    Minutes of Exercise per Session: Not on file   Stress:     Feeling of Stress : Not on file   Social Connections:     Frequency of Communication with Friends and Family: Not on file    Frequency of Social Gatherings with Friends and Family: Not on file    Attends Jewish Services: Not on file    Active Member of Clubs or Organizations: Not on file    Attends Club or Organization Meetings: Not on file    Marital Status: Not on file   Intimate Partner Violence:     Fear of Current or Ex-Partner: Not on file    Emotionally Abused: Not on file    Physically Abused: Not on file    Sexually Abused: Not on file   Housing Stability:     Unable to Pay for Housing in the Last Year: Not on file    Number of [place] in the Last Year: Not on file    Unstable Housing in the Last Year: Not on file       Family History   Problem Relation Age of Onset    Heart Disease Mother     Kidney Disease Mother     Heart Disease Father     Cancer Father     Heart Disease Brother     Diabetes Brother        Blood pressure 105/71, pulse 56, height 5' 4\" (1.626 m), weight 151 lb (68.5 kg). General:   Well developed, well nourished  Lungs:   Clear to auscultation  Heart:    Normal S1 S2, No murmur, rubs, or gallops  Abdomen:   Soft, non tender, no organomegalies, positive bowel sounds  Extremities:   No edema, no cyanosis, good peripheral pulses  Neurological:   Awake, alert, oriented. No obvious focal deficits  Musculoskeletal:  No obvious deformities    EKG:          Diagnosis Orders   1. Primary hypertension     2. Coronary artery disease involving native coronary artery of native heart without angina pectoris     3. Familial hypercholesterolemia         No orders of the defined types were placed in this encounter. Assessment/Plan:   Leg heaviness- no swelling, no weight gain. Has been on crestor for a while, no recent changes. No pain with walking, only weakness. Could consider PAD but less likely. More likely to be muscle issue. HTN/HLD-well controlled, lower BP. HLD is much improved on crestor. Continue current treatment. CAD- consider anginal equivalent if no improvement.  Will cut back on imdur to help improve BP a bit, although no significant dizziness symptoms. Recent stress test last fall was okay. Echo was okay then too. Consider ? For anginal symptoms if no improvement d/t significant history and 5 stents per patient. Disposition:   F/u with Dr Vandana Adams as scheduled.    Continue Dr Sánchez Patterson current treatment plan  Follow up with Dr Vandana Adams as scheduled or sooner if needed

## 2022-05-25 ENCOUNTER — HOSPITAL ENCOUNTER (OUTPATIENT)
Age: 72
Discharge: HOME | End: 2022-05-25
Payer: MEDICARE

## 2022-05-25 ENCOUNTER — OFFICE VISIT (OUTPATIENT)
Dept: CARDIOLOGY CLINIC | Age: 72
End: 2022-05-25
Payer: MEDICARE

## 2022-05-25 VITALS
HEIGHT: 64 IN | WEIGHT: 151 LBS | BODY MASS INDEX: 25.78 KG/M2 | SYSTOLIC BLOOD PRESSURE: 105 MMHG | HEART RATE: 56 BPM | DIASTOLIC BLOOD PRESSURE: 71 MMHG

## 2022-05-25 DIAGNOSIS — R53.83 OTHER FATIGUE: ICD-10-CM

## 2022-05-25 DIAGNOSIS — E78.01 FAMILIAL HYPERCHOLESTEROLEMIA: ICD-10-CM

## 2022-05-25 DIAGNOSIS — I25.10 CORONARY ARTERY DISEASE INVOLVING NATIVE CORONARY ARTERY OF NATIVE HEART WITHOUT ANGINA PECTORIS: ICD-10-CM

## 2022-05-25 DIAGNOSIS — R29.898 LEG HEAVINESS: ICD-10-CM

## 2022-05-25 DIAGNOSIS — I10 PRIMARY HYPERTENSION: Primary | ICD-10-CM

## 2022-05-25 LAB
ANION GAP SERPL CALCULATED.3IONS-SCNC: 10 MEQ/L (ref 8–16)
BUN BLDV-MCNC: 18 MG/DL (ref 7–22)
CALCIUM SERPL-MCNC: 9.1 MG/DL (ref 8.5–10.5)
CHLORIDE BLD-SCNC: 104 MEQ/L (ref 98–111)
CO2: 26 MEQ/L (ref 23–33)
CREAT SERPL-MCNC: 1.2 MG/DL (ref 0.4–1.2)
ERYTHROCYTE [DISTWIDTH] IN BLOOD BY AUTOMATED COUNT: 13.3 % (ref 11.5–14.5)
ERYTHROCYTE [DISTWIDTH] IN BLOOD BY AUTOMATED COUNT: 47.8 FL (ref 35–45)
GFR SERPL CREATININE-BSD FRML MDRD: 44 ML/MIN/1.73M2
GLUCOSE BLD-MCNC: 93 MG/DL (ref 70–108)
HCT VFR BLD CALC: 43.1 % (ref 37–47)
HEMOGLOBIN: 13.8 GM/DL (ref 12–16)
MCH RBC QN AUTO: 31.5 PG (ref 26–33)
MCHC RBC AUTO-ENTMCNC: 32 GM/DL (ref 32.2–35.5)
MCV RBC AUTO: 98.4 FL (ref 81–99)
PLATELET # BLD: 233 THOU/MM3 (ref 130–400)
PMV BLD AUTO: 10.4 FL (ref 9.4–12.4)
POTASSIUM SERPL-SCNC: 4.3 MEQ/L (ref 3.5–5.2)
RBC # BLD: 4.38 MILL/MM3 (ref 4.2–5.4)
SODIUM BLD-SCNC: 140 MEQ/L (ref 135–145)
WBC # BLD: 4.1 THOU/MM3 (ref 4.8–10.8)

## 2022-05-25 PROCEDURE — 1036F TOBACCO NON-USER: CPT | Performed by: STUDENT IN AN ORGANIZED HEALTH CARE EDUCATION/TRAINING PROGRAM

## 2022-05-25 PROCEDURE — 85027 COMPLETE CBC AUTOMATED: CPT

## 2022-05-25 PROCEDURE — G8427 DOCREV CUR MEDS BY ELIG CLIN: HCPCS | Performed by: STUDENT IN AN ORGANIZED HEALTH CARE EDUCATION/TRAINING PROGRAM

## 2022-05-25 PROCEDURE — 80048 BASIC METABOLIC PNL TOTAL CA: CPT

## 2022-05-25 PROCEDURE — 1090F PRES/ABSN URINE INCON ASSESS: CPT | Performed by: STUDENT IN AN ORGANIZED HEALTH CARE EDUCATION/TRAINING PROGRAM

## 2022-05-25 PROCEDURE — G8417 CALC BMI ABV UP PARAM F/U: HCPCS | Performed by: STUDENT IN AN ORGANIZED HEALTH CARE EDUCATION/TRAINING PROGRAM

## 2022-05-25 PROCEDURE — 1123F ACP DISCUSS/DSCN MKR DOCD: CPT | Performed by: STUDENT IN AN ORGANIZED HEALTH CARE EDUCATION/TRAINING PROGRAM

## 2022-05-25 PROCEDURE — 36415 COLL VENOUS BLD VENIPUNCTURE: CPT

## 2022-05-25 PROCEDURE — 3017F COLORECTAL CA SCREEN DOC REV: CPT | Performed by: STUDENT IN AN ORGANIZED HEALTH CARE EDUCATION/TRAINING PROGRAM

## 2022-05-25 PROCEDURE — 99214 OFFICE O/P EST MOD 30 MIN: CPT | Performed by: STUDENT IN AN ORGANIZED HEALTH CARE EDUCATION/TRAINING PROGRAM

## 2022-05-25 PROCEDURE — G8400 PT W/DXA NO RESULTS DOC: HCPCS | Performed by: STUDENT IN AN ORGANIZED HEALTH CARE EDUCATION/TRAINING PROGRAM

## 2022-05-25 RX ORDER — ISOSORBIDE MONONITRATE 30 MG/1
30 TABLET, EXTENDED RELEASE ORAL DAILY
Qty: 90 TABLET | Refills: 3 | Status: SHIPPED | OUTPATIENT
Start: 2022-05-25 | End: 2022-10-03

## 2022-06-20 NOTE — PROGRESS NOTES
[facility] PROFESSION[facility]  [facility]   [address]   [address] [address]   Dept: [phone]   Dept Fax: [phone]   Loc: [phone]      Chief Complaint   Patient presents with    Follow-up    Fatigue    Coronary Artery Disease     Cardiologist:  Dr. Rhonda Baxter  81 yo female presents for 4 week f/u for leg heaviness. Hx of CAD s/p PCI, HTN, HLD. Still very fatigued. Breathing is okay at rest, SOB with exertion. No chest pain, has leg fatigue, no dizziness. Symptoms not much better. BP is still low. No obvious symptoms from BP. Thinks she just needs a cath. Has multiple stents.      General:   No fever, no chills, no weight loss, + fatigue  Pulmonary:    + dyspnea, no wheezing  Cardiac:    Denies recent chest pain   GI:     No nausea or vomiting, no abdominal pain  Neuro:      No dizziness or light headedness  Musculoskeletal:  + leg weakness  Extremities:   No edema      Past Medical History:   Diagnosis Date    Arthritis     CAD (coronary artery disease)     Hyperlipidemia     Hypertension     Hypothyroidism     S/P coronary artery stent placement 8/14/15    proximal RCA 3.5x33mm Drug Eluting Stent    S/P coronary artery stent placement 8/14/15    mid PDA 2.5x15mm Drug Eluting Stent    S/P coronary artery stent placement 8/14/15    mid PLV 2.5x18mm Drug Eluting Stent    S/P coronary artery stent placement 2010    LAD    S/P coronary artery stent placement 2010       Allergies   Allergen Reactions    Requip [Ropinirole Hcl]     Statins        Current Outpatient Medications   Medication Sig Dispense Refill    isosorbide mononitrate (IMDUR) 30 MG extended release tablet Take 1 tablet by mouth daily 90 tablet 3    clopidogrel (PLAVIX) 75 MG tablet TAKE 1 TABLET BY MOUTH DAILY AS DIRECTED 90 tablet 1    rosuvastatin (CRESTOR) 20 MG tablet TAKE 1 TABLET BY MOUTH DAILY 90 tablet 1    alendronate (FOSAMAX) 70 MG tablet Take 70 mg by mouth every 7 days  nitroGLYCERIN (NITROSTAT) 0.4 MG SL tablet Place 1 tablet under the tongue every 5 minutes as needed for Chest pain 25 tablet 3    metoprolol tartrate (LOPRESSOR) 25 MG tablet Take 0.5 tablets by mouth 2 times daily 180 tablet 3    levothyroxine (SYNTHROID) 100 MCG tablet 88 mcg       traZODone (DESYREL) 50 MG tablet Take 25 mg by mouth nightly      citalopram (CELEXA) 40 MG tablet Take 40 mg by mouth daily      aspirin 81 MG EC tablet Take 1 tablet by mouth daily 30 tablet 0    temazepam (RESTORIL) 15 MG capsule Take 15 mg by mouth nightly as needed for Sleep       No current facility-administered medications for this visit. Social History     Socioeconomic History    Marital status:      Spouse name: None    Number of children: None    Years of education: None    Highest education level: None   Occupational History    Occupation:    Tobacco Use    Smoking status: Passive Smoke Exposure - Never Smoker    Smokeless tobacco: Never Used   Vaping Use    Vaping Use: Never used   Substance and Sexual Activity    Alcohol use: No    Drug use: No    Sexual activity: None   Other Topics Concern    None   Social History Narrative    None     Social Determinants of Health     Financial Resource Strain:     Difficulty of Paying Living Expenses: Not on file   Food Insecurity:     Worried About Running Out of Food in the Last Year: Not on file    Tao of Food in the Last Year: Not on file   Transportation Needs:     Lack of Transportation (Medical): Not on file    Lack of Transportation (Non-Medical):  Not on file   Physical Activity:     Days of Exercise per Week: Not on file    Minutes of Exercise per Session: Not on file   Stress:     Feeling of Stress : Not on file   Social Connections:     Frequency of Communication with Friends and Family: Not on file    Frequency of Social Gatherings with Friends and Family: Not on file    Attends Buddhism Services: Not on file   [facility] [employer] or Organizations: Not on file    Attends Club or Organization Meetings: Not on file    Marital Status: Not on file   Intimate Partner Violence:     Fear of Current or Ex-Partner: Not on file    Emotionally Abused: Not on file    Physically Abused: Not on file    Sexually Abused: Not on file   Housing Stability:     Unable to Pay for Housing in the Last Year: Not on file    Number of [place] in the Last Year: Not on file    Unstable Housing in the Last Year: Not on file       Family History   Problem Relation Age of Onset    Heart Disease Mother     Kidney Disease Mother     Heart Disease Father     Cancer Father     Heart Disease Brother     Diabetes Brother        Blood pressure 92/61, pulse 61, height 5' 4\" (1.626 m), weight 151 lb 6.4 oz (68.7 kg). General:   Well developed, well nourished  Lungs:   Clear to auscultation  Heart:    Normal S1 S2, No murmur, rubs, or gallops  Abdomen:   Soft, non tender, no organomegalies, positive bowel sounds  Extremities:   No edema, no cyanosis, good peripheral pulses  Neurological:   Awake, alert, oriented. No obvious focal deficits  Musculoskeletal:  No obvious deformities    EKG:          Diagnosis Orders   1. Claudication (HCC)  VL SHILOH BILATERAL LIMITED 1-2 LEVELS   2. Primary hypertension     3. Presence of stent in LAD coronary artery and RCA 40 to 50% as per cath 2012      4. Anginal equivalent ([address]         Orders Placed This Encounter   Procedures    VL SHILOH BILATERAL LIMITED 1-2 LEVELS     Standing Status:   Future     Standing Expiration Date:   6/22/2023       Assessment/Plan:   Fatigue/Leg heaviness/claudication- check SHILOH for possible claudication, has no pain but leg weakness with walking. HTN/HLD-well controlled, lower BP. HLD is much improved on crestor. Stop imdur and metoprolol for 2 weeks. If no improvement in symptoms, restart. ?  Angina/CAD- consider anginal equivalent with fatigue if no improvement with cutting back BP meds, may need LHC d/t symptoms. Will check SHILOH and stop imdur and lopressor to help improve BP, if no improvement will likely schedule for LHC. Disposition:   F/u with Dr Nette Hatch as scheduled.    Continue Dr Madsen Half current treatment plan  Follow up with Dr Nette Hatch as scheduled or sooner if needed

## 2022-06-22 ENCOUNTER — OFFICE VISIT (OUTPATIENT)
Dept: CARDIOLOGY CLINIC | Age: 72
End: 2022-06-22
Payer: MEDICARE

## 2022-06-22 VITALS
BODY MASS INDEX: 25.85 KG/M2 | SYSTOLIC BLOOD PRESSURE: 92 MMHG | DIASTOLIC BLOOD PRESSURE: 61 MMHG | HEIGHT: 64 IN | WEIGHT: 151.4 LBS | HEART RATE: 61 BPM

## 2022-06-22 DIAGNOSIS — I10 PRIMARY HYPERTENSION: ICD-10-CM

## 2022-06-22 DIAGNOSIS — Z95.5 PRESENCE OF STENT IN LAD CORONARY ARTERY: ICD-10-CM

## 2022-06-22 DIAGNOSIS — I73.9 CLAUDICATION (HCC): Primary | ICD-10-CM

## 2022-06-22 DIAGNOSIS — I20.8 ANGINAL EQUIVALENT (HCC): ICD-10-CM

## 2022-06-22 PROCEDURE — 99214 OFFICE O/P EST MOD 30 MIN: CPT | Performed by: STUDENT IN AN ORGANIZED HEALTH CARE EDUCATION/TRAINING PROGRAM

## 2022-06-22 PROCEDURE — G8417 CALC BMI ABV UP PARAM F/U: HCPCS | Performed by: STUDENT IN AN ORGANIZED HEALTH CARE EDUCATION/TRAINING PROGRAM

## 2022-06-22 PROCEDURE — G8427 DOCREV CUR MEDS BY ELIG CLIN: HCPCS | Performed by: STUDENT IN AN ORGANIZED HEALTH CARE EDUCATION/TRAINING PROGRAM

## 2022-06-22 PROCEDURE — 1090F PRES/ABSN URINE INCON ASSESS: CPT | Performed by: STUDENT IN AN ORGANIZED HEALTH CARE EDUCATION/TRAINING PROGRAM

## 2022-06-22 PROCEDURE — 1123F ACP DISCUSS/DSCN MKR DOCD: CPT | Performed by: STUDENT IN AN ORGANIZED HEALTH CARE EDUCATION/TRAINING PROGRAM

## 2022-06-22 PROCEDURE — 1036F TOBACCO NON-USER: CPT | Performed by: STUDENT IN AN ORGANIZED HEALTH CARE EDUCATION/TRAINING PROGRAM

## 2022-06-22 PROCEDURE — 3017F COLORECTAL CA SCREEN DOC REV: CPT | Performed by: STUDENT IN AN ORGANIZED HEALTH CARE EDUCATION/TRAINING PROGRAM

## 2022-06-22 PROCEDURE — G8400 PT W/DXA NO RESULTS DOC: HCPCS | Performed by: STUDENT IN AN ORGANIZED HEALTH CARE EDUCATION/TRAINING PROGRAM

## 2022-06-22 NOTE — PATIENT INSTRUCTIONS
Check ultrasound of your legs in the next 1-2 weeks. Stop imdur and metoprolol to improve blood pressure for the next 2 weeks and if not change in BP or symptoms. You may receive a survey regarding the care you received during your visit. Your input is valuable to us. We encourage you to complete and return your survey. We hope you will choose us in the future for your healthcare needs.

## 2022-08-29 RX ORDER — ISOSORBIDE MONONITRATE 60 MG/1
60 TABLET, EXTENDED RELEASE ORAL DAILY
Qty: 90 TABLET | Refills: 1 | Status: SHIPPED | OUTPATIENT
Start: 2022-08-29

## 2022-10-03 ENCOUNTER — OFFICE VISIT (OUTPATIENT)
Dept: CARDIOLOGY CLINIC | Age: 72
End: 2022-10-03
Payer: MEDICARE

## 2022-10-03 VITALS
DIASTOLIC BLOOD PRESSURE: 68 MMHG | WEIGHT: 165.2 LBS | HEART RATE: 49 BPM | BODY MASS INDEX: 28.2 KG/M2 | SYSTOLIC BLOOD PRESSURE: 121 MMHG | HEIGHT: 64 IN

## 2022-10-03 DIAGNOSIS — I25.10 CORONARY ARTERY DISEASE INVOLVING NATIVE CORONARY ARTERY OF NATIVE HEART, UNSPECIFIED WHETHER ANGINA PRESENT: ICD-10-CM

## 2022-10-03 DIAGNOSIS — R07.89 CHEST PAIN, ATYPICAL: ICD-10-CM

## 2022-10-03 DIAGNOSIS — R94.31 ABNORMAL EKG: ICD-10-CM

## 2022-10-03 DIAGNOSIS — Z95.5 PRESENCE OF STENT IN LAD CORONARY ARTERY: ICD-10-CM

## 2022-10-03 DIAGNOSIS — I73.9 CLAUDICATION (HCC): ICD-10-CM

## 2022-10-03 DIAGNOSIS — E55.9 VITAMIN D DEFICIENCY: ICD-10-CM

## 2022-10-03 DIAGNOSIS — E78.00 PURE HYPERCHOLESTEROLEMIA: ICD-10-CM

## 2022-10-03 DIAGNOSIS — I10 PRIMARY HYPERTENSION: ICD-10-CM

## 2022-10-03 DIAGNOSIS — R06.09 DOE (DYSPNEA ON EXERTION): ICD-10-CM

## 2022-10-03 DIAGNOSIS — R00.1 SINUS BRADYCARDIA: ICD-10-CM

## 2022-10-03 DIAGNOSIS — I73.9 INTERMITTENT CLAUDICATION (HCC): Primary | ICD-10-CM

## 2022-10-03 DIAGNOSIS — R53.82 CHRONIC FATIGUE: ICD-10-CM

## 2022-10-03 PROCEDURE — G8484 FLU IMMUNIZE NO ADMIN: HCPCS | Performed by: INTERNAL MEDICINE

## 2022-10-03 PROCEDURE — 99214 OFFICE O/P EST MOD 30 MIN: CPT | Performed by: INTERNAL MEDICINE

## 2022-10-03 PROCEDURE — 1123F ACP DISCUSS/DSCN MKR DOCD: CPT | Performed by: INTERNAL MEDICINE

## 2022-10-03 PROCEDURE — G8417 CALC BMI ABV UP PARAM F/U: HCPCS | Performed by: INTERNAL MEDICINE

## 2022-10-03 PROCEDURE — 1090F PRES/ABSN URINE INCON ASSESS: CPT | Performed by: INTERNAL MEDICINE

## 2022-10-03 PROCEDURE — G8400 PT W/DXA NO RESULTS DOC: HCPCS | Performed by: INTERNAL MEDICINE

## 2022-10-03 PROCEDURE — 1036F TOBACCO NON-USER: CPT | Performed by: INTERNAL MEDICINE

## 2022-10-03 PROCEDURE — 93000 ELECTROCARDIOGRAM COMPLETE: CPT | Performed by: INTERNAL MEDICINE

## 2022-10-03 PROCEDURE — 3017F COLORECTAL CA SCREEN DOC REV: CPT | Performed by: INTERNAL MEDICINE

## 2022-10-03 PROCEDURE — G8427 DOCREV CUR MEDS BY ELIG CLIN: HCPCS | Performed by: INTERNAL MEDICINE

## 2022-10-03 RX ORDER — HYDROCHLOROTHIAZIDE 12.5 MG/1
12.5 TABLET ORAL DAILY
COMMUNITY
End: 2022-10-03

## 2022-10-03 NOTE — PROGRESS NOTES
Chief Complaint   Patient presents with    6 Month Follow-Up    Coronary Artery Disease   former pat of Dr. Patti Severino  Hx of CAD with multiple stent          Pt here for a 6 month f/u    EKG done today  One episode of chest pain RT upper chest last for 20 sec and resolved on its own.  And none after that    Feel fatigue and feel tired for over 1 yr    Feel tired event sitting- at rest    Intermittent claudication on the calf  Both side for the last 6 months and better with rest    Pt denies dizziness, heart palpitations, or edema    Sob on exertion on stairs-chronic-worse    Off statin due to intolerance and now on rrepatha    Record reviewed      Patient Active Problem List   Diagnosis    Hyperlipidemia    Hypothyroidism    Hypertension    Presence of stent in LAD coronary artery and RCA 40 to 50% as per cath 2012     S/P cardiac catheterization    Coronary artery disease involving native coronary artery of native heart without angina pectoris    Abnormal EKG    LEVI (dyspnea on exertion) ONLY FLIGHT OF STAIRS FOR YRS    Coronary artery disease involving native coronary artery of native heart    Chronic fatigue    Intermittent claudication (HCC)    Chest pain, atypical once 1 week back    Sinus bradycardia       Past Surgical History:   Procedure Laterality Date    CARDIAC CATHETERIZATION  2012    CARDIOVASCULAR STRESS TEST      COLECTOMY  01/10/2020    COLECTOMY  01/2020    [facility] with Dr. Tesha Aguilera  11/2009    EYE SURGERY         Allergies   Allergen Reactions    Requip [Ropinirole Hcl]     Statins         Family History   Problem Relation Age of Onset    Heart Disease Mother     Kidney Disease Mother     Heart Disease Father     Cancer Father     Heart Disease Brother     Diabetes Brother         Social History     Socioeconomic History    Marital status:      Spouse name: Not on file    Number of children: Not on file    Years of education: Not on file Highest education level: Not on file   Occupational History    Occupation:    Tobacco Use    Smoking status: Passive Smoke Exposure - Never Smoker    Smokeless tobacco: Never   Vaping Use    Vaping Use: Never used   Substance and Sexual Activity    Alcohol use: No    Drug use: No    Sexual activity: Not on file   Other Topics Concern    Not on file   Social History Narrative    Not on file     Social Determinants of Health     Financial Resource Strain: Not on file   Food Insecurity: Not on file   Transportation Needs: Not on file   Physical Activity: Not on file   Stress: Not on file   Social Connections: Not on file   Intimate Partner Violence: Not on file   Housing Stability: Not on file       Current Outpatient Medications   Medication Sig Dispense Refill    isosorbide mononitrate (IMDUR) 60 MG extended release tablet TAKE 1 TABLET BY MOUTH DAILY 90 tablet 1    clopidogrel (PLAVIX) 75 MG tablet TAKE 1 TABLET BY MOUTH DAILY AS DIRECTED 90 tablet 1    rosuvastatin (CRESTOR) 20 MG tablet TAKE 1 TABLET BY MOUTH DAILY 90 tablet 1    nitroGLYCERIN (NITROSTAT) 0.4 MG SL tablet Place 1 tablet under the tongue every 5 minutes as needed for Chest pain 25 tablet 3    levothyroxine (SYNTHROID) 100 MCG tablet 88 mcg       traZODone (DESYREL) 50 MG tablet Take 25 mg by mouth nightly      citalopram (CELEXA) 40 MG tablet Take 40 mg by mouth daily      aspirin 81 MG EC tablet Take 1 tablet by mouth daily 30 tablet 0    temazepam (RESTORIL) 15 MG capsule Take 15 mg by mouth nightly as needed for Sleep      alendronate (FOSAMAX) 70 MG tablet Take 70 mg by mouth every 7 days  (Patient not taking: Reported on 10/3/2022)       No current facility-administered medications for this visit. Review of Systems -     General ROS: negative  Psychological ROS: negative  Hematological and Lymphatic ROS: No history of blood clots or bleeding disorder.    Respiratory ROS: no cough, shortness of breath, or wheezing  Cardiovascular ROS: see HPI  Gastrointestinal ROS: negative  Genito-Urinary ROS: no dysuria, trouble voiding, or hematuria  Musculoskeletal ROS: negative  Neurological ROS: no TIA or stroke symptoms  Dermatological ROS: negative      Blood pressure 121/68, pulse (!) 49, height 5' 4\" (1.626 m), weight 165 lb 3.2 oz (74.9 kg). Physical Examination:    General appearance - alert, well appearing, and in no distress  Mental status - alert, oriented to person, place, and time  Neck - supple, no significant adenopathy, no JVD, or carotid bruits  Chest - clear to auscultation, no wheezes, rales or rhonchi, symmetric air entry  Heart - normal rate, regular rhythm, normal S1, S2, no murmurs, rubs, clicks or gallops  Abdomen - soft, nontender, nondistended, no masses or organomegaly  Neurological - alert, oriented, normal speech, no focal findings or movement disorder noted  Musculoskeletal - no joint tenderness, deformity or swelling  Extremities - peripheral pulses normal, no pedal edema, no clubbing or cyanosis  Skin - normal coloration and turgor, no rashes, no suspicious skin lesions noted    Lab  No results for input(s): CKTOTAL, CKMB, CKMBINDEX, TROPONINI in the last 72 hours.   CBC:   Lab Results   Component Value Date/Time    WBC 4.1 05/25/2022 10:11 AM    RBC 4.38 05/25/2022 10:11 AM    HGB 13.8 05/25/2022 10:11 AM    HCT 43.1 05/25/2022 10:11 AM    MCV 98.4 05/25/2022 10:11 AM    MCH 31.5 05/25/2022 10:11 AM    MCHC 32.0 05/25/2022 10:11 AM    RDW 14.1 10/14/2016 12:44 PM     05/25/2022 10:11 AM    MPV 10.4 05/25/2022 10:11 AM     BMP:    Lab Results   Component Value Date/Time     05/25/2022 10:11 AM    K 4.3 05/25/2022 10:11 AM     05/25/2022 10:11 AM    CO2 26 05/25/2022 10:11 AM    BUN 18 05/25/2022 10:11 AM    LABALBU 4.1 02/22/2022 09:02 AM    CREATININE 1.2 05/25/2022 10:11 AM    CALCIUM 9.1 05/25/2022 10:11 AM    LABGLOM 44 05/25/2022 10:11 AM    GLUCOSE 93 05/25/2022 10:11 AM     Hepatic Function Panel:    Lab Results   Component Value Date/Time    ALKPHOS 69 02/22/2022 09:02 AM    ALKPHOS 72 03/29/2021 01:17 PM    ALT 18 02/22/2022 09:02 AM    AST 20 02/22/2022 09:02 AM    PROT 6.9 02/22/2022 09:02 AM    BILITOT 0.4 02/22/2022 09:02 AM    BILIDIR 0.1 02/22/2022 09:02 AM    LABALBU 4.1 02/22/2022 09:02 AM     Magnesium:  No results found for: MG  Warfarin PT/INR:  No components found for: PTPATWAR, PTINRWAR  HgBA1c:  No results found for: LABA1C  FLP:    Lab Results   Component Value Date/Time    TRIG 123 02/22/2022 09:02 AM    HDL 54 02/22/2022 09:02 AM    LDLCALC 104 02/22/2022 09:02 AM    LABVLDL 25 02/22/2022 09:02 AM     TSH:    Lab Results   Component Value Date/Time    TSH 0.08 02/09/2019 12:00 AM     CONCLUSION:    1. RCA is a dominant vessel with about 10-20% in-stent restenosis and the  distal lesion which is 95% right before the bifurcation of PDA and PLV. 2.  The PDA has a stent which is patent and a large vessel. 3.  PLV has just mild luminal irregularity and is also patent. 4.  Left main is a large vessel which does not demonstrate a critical lesion. 5.  Circumflex, large vessel which is patent. 6. LAD stent is patent with maybe 10% lesion. 7.  Left ventriculography, ejection fraction 60%. 8.  No complication occurred. The patient is to undergo intervention by Dr. Geoffery Sacks of this high-grade lesion in the distal RCA. Db Cadet D.O.  10/18/2016       Conclusions      Procedure Summary   Left main patent   LAD stent 10% ins stent re stenosis   Lcx is patent      RCA with 20% in stent re stenosis   Distal RCA has 95% lesion   RCA/PDA/PLV strents are patent      S/P successful PCI of the distal Right Coronary Artery with 3.5-34 mm drug   eluting stent, post dilated up to 3.6 mm.     D: 10/14/2016      EKG 5/4/18  Sinus  Bradycardia   Low voltage in precordial leads.      ABNORMAL     EKG 11/8/18  NSR, no acute ekg abn    ekg   5/18/2020  Sinus Bradycardia   Low voltage -possible pulmonary disease. ABNORMAL     ekg 9/29/2021  Sinus  Bradycardia   Low voltage in precordial leads. -PRWP  ABNORMAL     Ekg 10/3/22    Marked sinus  Bradycardia 49 bpm   Low voltage in precordial leads. ABNORMAL         Assessment   Diagnosis Orders   1. NULL (dyspnea on exertion)  EKG 12 Lead    TSH with Reflex    Vitamin D 25 Hydroxy    Holter monitor 48 hour      2. Chronic fatigue  TSH with Reflex    Vitamin D 25 Hydroxy    Holter monitor 48 hour      3. Chest pain, atypical once 1 week back  TSH with Reflex    Vitamin D 25 Hydroxy    Holter monitor 48 hour      4. Intermittent claudication (HCC)  TSH with Reflex    Vitamin D 25 Hydroxy    Holter monitor 48 hour      5. Primary hypertension  EKG 12 Lead    TSH with Reflex    Vitamin D 25 Hydroxy    Holter monitor 48 hour      6. Pure hypercholesterolemia  TSH with Reflex    Vitamin D 25 Hydroxy    Holter monitor 48 hour      7. Coronary artery disease involving native coronary artery of native heart, unspecified whether angina present  TSH with Reflex    Vitamin D 25 Hydroxy    Holter monitor 48 hour      8. Presence of stent in LAD coronary artery and RCA 40 to 50% as per cath 2012   TSH with Reflex    Vitamin D 25 Hydroxy    Holter monitor 48 hour      9. Abnormal EKG  TSH with Reflex    Vitamin D 25 Hydroxy    Holter monitor 48 hour      10. Vitamin D deficiency  Vitamin D 25 Hydroxy      11. Sinus bradycardia            Plan   The   Most current meds and labs reviewed    Continue the current treatment and with constant vigilance to changes in symptoms and also any potential side effects. Return for care or seek medical attention immediately if symptoms got worse and/or develop new symptoms.     Coronary artery disease,   Sob  Fatigue  Cp  Sinus drake  Cont lopressor and Imdur 60 mg po qd  Cont asa and plavix  Null ON FLIGHT OF STAIRS FOR YRS AND STABLE  Echo and agapito  TSH and Vit d  Holter 48 hrs  Stop lopressor 12.5 po bid and holter 1 weeks post stopping    Intermittent claudication  SHILOH and PVR    Pat on antidepressant    Hypertension, on medical treatment. Seems to be under good control. Patient is compliant with medical treatment. Hx of  low  normal BP  Off  HCTZ 12.5 mg po qd   No dizziness     Hyperlipidemia: on statins, followed periodically. Patient need periodic lipid and liver profile. Hyperlipidemia: pat off  Statins, pravastatin, Lipitor for myalgia  HLP  Off statin   , 126 then 237 and then 73  Could not afford PCS K9 repatha  readvised  To be on meds  TOLERATING  crestor 20 mg po qd  LDL went down to 73 from 237 after crestor  Increase crestor  40 mg po qd from 20 for better control    Lipid panel and liver function test before next appointment    Reviewed the records    D/w the pat the plan of care    Discussed use, benefit, and side effects of prescribed medications. All patient questions answered. Pt voiced understanding. Instructed to continue current medications, diet and exercise. Continue risk factor modification and medical management. Patient agreed with treatment plan. Follow up as directed.     RTC in 1 months      Keon Pacheco, [facility]

## 2022-10-08 LAB
ALBUMIN SERPL-MCNC: 4.7 G/DL (ref 3.5–5.2)
ALK PHOSPHATASE: 89 U/L (ref 40–142)
ALT SERPL-CCNC: 13 U/L (ref 5–40)
ANION GAP SERPL CALCULATED.3IONS-SCNC: 9 MEQ/L (ref 7–16)
AST SERPL-CCNC: 21 U/L (ref 9–40)
BILIRUB SERPL-MCNC: 0.3 MG/DL
BILIRUBIN DIRECT: <0.2 MG/DL (ref 0–0.3)
BUN BLDV-MCNC: 22 MG/DL (ref 8–23)
CALCIUM SERPL-MCNC: 9.3 MG/DL (ref 8.5–10.5)
CHLORIDE BLD-SCNC: 108 MEQ/L (ref 95–107)
CHOLESTEROL/HDL RATIO: 2.5 RATIO
CHOLESTEROL: 168 MG/DL
CO2: 26 MEQ/L (ref 19–31)
CREAT SERPL-MCNC: 1.38 MG/DL (ref 0.6–1.3)
EGFR IF NONAFRICAN AMERICAN: 41 ML/MIN/1.73
GLUCOSE: 87 MG/DL (ref 70–99)
HDLC SERPL-MCNC: 66 MG/DL
LDL CHOLESTEROL CALCULATED: 86 MG/DL
LDL/HDL RATIO: 1.3 RATIO
MAGNESIUM: 2.1 MG/DL (ref 1.6–2.6)
POTASSIUM SERPL-SCNC: 4.1 MEQ/L (ref 3.5–5.4)
SODIUM BLD-SCNC: 143 MEQ/L (ref 133–146)
TOTAL PROTEIN: 6.7 G/DL (ref 6.1–8.3)
TRIGL SERPL-MCNC: 79 MG/DL
TSH SERPL DL<=0.05 MIU/L-ACNC: 1.61 UIU/ML (ref 0.4–4.1)
VITAMIN D 25-HYDROXY: 38 NG/ML
VLDLC SERPL CALC-MCNC: 16 MG/DL

## 2022-10-17 ENCOUNTER — HOSPITAL ENCOUNTER (OUTPATIENT)
Dept: NON INVASIVE DIAGNOSTICS | Age: 72
Discharge: HOME | End: 2022-10-17
Payer: MEDICARE

## 2022-10-17 ENCOUNTER — HOSPITAL ENCOUNTER (OUTPATIENT)
Dept: INTERVENTIONAL RADIOLOGY/VASCULAR | Age: 72
Discharge: HOME | End: 2022-10-17
Payer: MEDICARE

## 2022-10-17 VITALS — BODY MASS INDEX: 26.61 KG/M2 | WEIGHT: 155 LBS

## 2022-10-17 DIAGNOSIS — I10 PRIMARY HYPERTENSION: ICD-10-CM

## 2022-10-17 DIAGNOSIS — Z95.5 PRESENCE OF STENT IN LAD CORONARY ARTERY: ICD-10-CM

## 2022-10-17 DIAGNOSIS — R94.31 ABNORMAL EKG: ICD-10-CM

## 2022-10-17 DIAGNOSIS — R07.89 CHEST PAIN, ATYPICAL: ICD-10-CM

## 2022-10-17 DIAGNOSIS — R06.09 DOE (DYSPNEA ON EXERTION): ICD-10-CM

## 2022-10-17 DIAGNOSIS — I73.9 INTERMITTENT CLAUDICATION (HCC): ICD-10-CM

## 2022-10-17 DIAGNOSIS — I25.10 CORONARY ARTERY DISEASE INVOLVING NATIVE CORONARY ARTERY OF NATIVE HEART, UNSPECIFIED WHETHER ANGINA PRESENT: ICD-10-CM

## 2022-10-17 DIAGNOSIS — I73.9 CLAUDICATION (HCC): ICD-10-CM

## 2022-10-17 DIAGNOSIS — R00.1 SINUS BRADYCARDIA: ICD-10-CM

## 2022-10-17 DIAGNOSIS — E78.00 PURE HYPERCHOLESTEROLEMIA: ICD-10-CM

## 2022-10-17 DIAGNOSIS — E55.9 VITAMIN D DEFICIENCY: ICD-10-CM

## 2022-10-17 DIAGNOSIS — R53.82 CHRONIC FATIGUE: ICD-10-CM

## 2022-10-17 PROCEDURE — 78452 HT MUSCLE IMAGE SPECT MULT: CPT

## 2022-10-17 PROCEDURE — 6360000002 HC RX W HCPCS

## 2022-10-17 PROCEDURE — 93226 XTRNL ECG REC<48 HR SCAN A/R: CPT

## 2022-10-17 PROCEDURE — 93923 UPR/LXTR ART STDY 3+ LVLS: CPT

## 2022-10-17 PROCEDURE — 93306 TTE W/DOPPLER COMPLETE: CPT

## 2022-10-17 PROCEDURE — 3430000000 HC RX DIAGNOSTIC RADIOPHARMACEUTICAL: Performed by: INTERNAL MEDICINE

## 2022-10-17 PROCEDURE — A9500 TC99M SESTAMIBI: HCPCS | Performed by: INTERNAL MEDICINE

## 2022-10-17 PROCEDURE — 93225 XTRNL ECG REC<48 HRS REC: CPT

## 2022-10-17 PROCEDURE — 93017 CV STRESS TEST TRACING ONLY: CPT | Performed by: INTERNAL MEDICINE

## 2022-10-17 RX ADMIN — Medication 35 MILLICURIE: at 12:18

## 2022-10-17 RX ADMIN — Medication 10.8 MILLICURIE: at 11:07

## 2022-10-17 NOTE — PROCEDURES
The skin was prepped and a 48 hour holter monitor was applied. The patient was instructed on the documentation of symptoms and the purpose of the holter as well as the things to avoid while wearing the holter.    The patient was instructed to remove and return the holter on 10/19/2022  The serial number of the holter that was applied is [id number]

## 2022-10-27 LAB
ACQUISITION DURATION: NORMAL S
AVERAGE HEART RATE: 65 BPM
HOOKUP DATE: NORMAL
HOOKUP TIME: NORMAL
MAX HEART RATE TIME/DATE: NORMAL
MAX HEART RATE: 104 BPM
MIN HEART RATE TIME/DATE: NORMAL
MIN HEART RATE: 51 BPM
NUMBER OF QRS COMPLEXES: NORMAL
NUMBER OF SUPRAVENTRICULAR COUPLETS: 1
NUMBER OF SUPRAVENTRICULAR ECTOPICS: 315
NUMBER OF SUPRAVENTRICULAR ISOLATED BEATS: 219
NUMBER OF VENTRICULAR BIGEMINAL CYCLES: 0
NUMBER OF VENTRICULAR COUPLETS: 0
NUMBER OF VENTRICULAR ECTOPICS: 10

## 2022-10-31 ENCOUNTER — OFFICE VISIT (OUTPATIENT)
Dept: CARDIOLOGY CLINIC | Age: 72
End: 2022-10-31
Payer: MEDICARE

## 2022-10-31 VITALS
BODY MASS INDEX: 26.91 KG/M2 | WEIGHT: 157.6 LBS | HEIGHT: 64 IN | HEART RATE: 60 BPM | DIASTOLIC BLOOD PRESSURE: 87 MMHG | SYSTOLIC BLOOD PRESSURE: 148 MMHG

## 2022-10-31 DIAGNOSIS — R00.1 SINUS BRADYCARDIA: ICD-10-CM

## 2022-10-31 DIAGNOSIS — E78.00 PURE HYPERCHOLESTEROLEMIA: ICD-10-CM

## 2022-10-31 DIAGNOSIS — I10 PRIMARY HYPERTENSION: ICD-10-CM

## 2022-10-31 DIAGNOSIS — Z95.5 PRESENCE OF STENT IN LAD CORONARY ARTERY: ICD-10-CM

## 2022-10-31 DIAGNOSIS — I25.10 CORONARY ARTERY DISEASE INVOLVING NATIVE CORONARY ARTERY OF NATIVE HEART WITHOUT ANGINA PECTORIS: Primary | ICD-10-CM

## 2022-10-31 DIAGNOSIS — R94.31 ABNORMAL EKG: ICD-10-CM

## 2022-10-31 PROBLEM — R07.89 CHEST PAIN, ATYPICAL: Status: RESOLVED | Noted: 2022-10-03 | Resolved: 2022-10-31

## 2022-10-31 PROBLEM — I73.9 INTERMITTENT CLAUDICATION (HCC): Status: RESOLVED | Noted: 2022-10-03 | Resolved: 2022-10-31

## 2022-10-31 PROCEDURE — 99214 OFFICE O/P EST MOD 30 MIN: CPT | Performed by: INTERNAL MEDICINE

## 2022-10-31 PROCEDURE — G8400 PT W/DXA NO RESULTS DOC: HCPCS | Performed by: INTERNAL MEDICINE

## 2022-10-31 PROCEDURE — 3074F SYST BP LT 130 MM HG: CPT | Performed by: INTERNAL MEDICINE

## 2022-10-31 PROCEDURE — 1090F PRES/ABSN URINE INCON ASSESS: CPT | Performed by: INTERNAL MEDICINE

## 2022-10-31 PROCEDURE — G8484 FLU IMMUNIZE NO ADMIN: HCPCS | Performed by: INTERNAL MEDICINE

## 2022-10-31 PROCEDURE — 1123F ACP DISCUSS/DSCN MKR DOCD: CPT | Performed by: INTERNAL MEDICINE

## 2022-10-31 PROCEDURE — 3017F COLORECTAL CA SCREEN DOC REV: CPT | Performed by: INTERNAL MEDICINE

## 2022-10-31 PROCEDURE — G8427 DOCREV CUR MEDS BY ELIG CLIN: HCPCS | Performed by: INTERNAL MEDICINE

## 2022-10-31 PROCEDURE — 1036F TOBACCO NON-USER: CPT | Performed by: INTERNAL MEDICINE

## 2022-10-31 PROCEDURE — 3078F DIAST BP <80 MM HG: CPT | Performed by: INTERNAL MEDICINE

## 2022-10-31 PROCEDURE — G8417 CALC BMI ABV UP PARAM F/U: HCPCS | Performed by: INTERNAL MEDICINE

## 2022-10-31 NOTE — PROGRESS NOTES
Chief Complaint   Patient presents with    Follow-up   former pat of Dr. Nguyen Pearson  Hx of CAD with multiple stent          3 weeks F/u- Stress, Echo, Holter    EKG 10-3-22    Feel better after stopped lopressor 12.5 po bid    Very few episodes of short lasting shooting chest [pain last few seconds- better on the Rt side    Leg calf pain on walking resolved and fatigue better after  bp comes up following cessation of lopressor 12.5 po bid    Denied dizziness, heart palpitations, or edema    Sob on exertion on stairs-chronic    Off statin due to intolerance and now on rrepatha    Record reviewed      Patient Active Problem List   Diagnosis    Hyperlipidemia    Hypothyroidism    Hypertension    Presence of stent in LAD coronary artery and RCA 40 to 50% as per cath 2012     S/P cardiac catheterization    Coronary artery disease involving native coronary artery of native heart without angina pectoris    Abnormal EKG    LEVI (dyspnea on exertion) ONLY FLIGHT OF STAIRS FOR YRS    Chronic fatigue    Hx of Sinus bradycardia resolved after stopped lopressor       Past Surgical History:   Procedure Laterality Date    CARDIAC CATHETERIZATION  2012    CARDIOVASCULAR STRESS TEST      COLECTOMY  01/10/2020    COLECTOMY  01/2020    [facility] with Dr. Javier Das  11/2009    EYE SURGERY         Allergies   Allergen Reactions    Requip [Ropinirole Hcl]     Statins         Family History   Problem Relation Age of Onset    Heart Disease Mother     Kidney Disease Mother     Heart Disease Father     Cancer Father     Heart Disease Brother     Diabetes Brother         Social History     Socioeconomic History    Marital status:      Spouse name: Not on file    Number of children: Not on file    Years of education: Not on file    Highest education level: Not on file   Occupational History    Occupation:    Tobacco Use    Smoking status: Passive Smoke Exposure - Never Smoker Smokeless tobacco: Never   Vaping Use    Vaping Use: Never used   Substance and Sexual Activity    Alcohol use: No    Drug use: No    Sexual activity: Not on file   Other Topics Concern    Not on file   Social History Narrative    Not on file     Social Determinants of Health     Financial Resource Strain: Not on file   Food Insecurity: Not on file   Transportation Needs: Not on file   Physical Activity: Not on file   Stress: Not on file   Social Connections: Not on file   Intimate Partner Violence: Not on file   Housing Stability: Not on file       Current Outpatient Medications   Medication Sig Dispense Refill    isosorbide mononitrate (IMDUR) 60 MG extended release tablet TAKE 1 TABLET BY MOUTH DAILY 90 tablet 1    clopidogrel (PLAVIX) 75 MG tablet TAKE 1 TABLET BY MOUTH DAILY AS DIRECTED 90 tablet 1    rosuvastatin (CRESTOR) 20 MG tablet TAKE 1 TABLET BY MOUTH DAILY 90 tablet 1    nitroGLYCERIN (NITROSTAT) 0.4 MG SL tablet Place 1 tablet under the tongue every 5 minutes as needed for Chest pain 25 tablet 3    levothyroxine (SYNTHROID) 100 MCG tablet 88 mcg       traZODone (DESYREL) 50 MG tablet Take 25 mg by mouth nightly      citalopram (CELEXA) 40 MG tablet Take 40 mg by mouth daily      aspirin 81 MG EC tablet Take 1 tablet by mouth daily 30 tablet 0    temazepam (RESTORIL) 15 MG capsule Take 15 mg by mouth nightly as needed for Sleep      alendronate (FOSAMAX) 70 MG tablet Take 70 mg by mouth every 7 days  (Patient not taking: No sig reported)       No current facility-administered medications for this visit. Review of Systems -     General ROS: negative  Psychological ROS: negative  Hematological and Lymphatic ROS: No history of blood clots or bleeding disorder.    Respiratory ROS: no cough, shortness of breath, or wheezing  Cardiovascular ROS: see HPI  Gastrointestinal ROS: negative  Genito-Urinary ROS: no dysuria, trouble voiding, or hematuria  Musculoskeletal ROS: negative  Neurological ROS: no TIA or stroke symptoms  Dermatological ROS: negative      Blood pressure (!) 148/87, pulse 60, height 5' 4\" (1.626 m), weight 157 lb 9.6 oz (71.5 kg). Physical Examination:    General appearance - alert, well appearing, and in no distress  Mental status - alert, oriented to person, place, and time  Neck - supple, no significant adenopathy, no JVD, or carotid bruits  Chest - clear to auscultation, no wheezes, rales or rhonchi, symmetric air entry  Heart - normal rate, regular rhythm, normal S1, S2, no murmurs, rubs, clicks or gallops  Abdomen - soft, nontender, nondistended, no masses or organomegaly  Neurological - alert, oriented, normal speech, no focal findings or movement disorder noted  Musculoskeletal - no joint tenderness, deformity or swelling  Extremities - peripheral pulses normal, no pedal edema, no clubbing or cyanosis  Skin - normal coloration and turgor, no rashes, no suspicious skin lesions noted    Lab  No results for input(s): CKTOTAL, CKMB, CKMBINDEX, TROPONINI in the last 72 hours.   CBC:   Lab Results   Component Value Date/Time    WBC 4.1 05/25/2022 10:11 AM    RBC 4.38 05/25/2022 10:11 AM    HGB 13.8 05/25/2022 10:11 AM    HCT 43.1 05/25/2022 10:11 AM    MCV 98.4 05/25/2022 10:11 AM    MCH 31.5 05/25/2022 10:11 AM    MCHC 32.0 05/25/2022 10:11 AM    RDW 14.1 10/14/2016 12:44 PM     05/25/2022 10:11 AM    MPV 10.4 05/25/2022 10:11 AM     BMP:    Lab Results   Component Value Date/Time     10/07/2022 08:15 AM    K 4.1 10/07/2022 08:15 AM     10/07/2022 08:15 AM    CO2 26 10/07/2022 08:15 AM    BUN 22 10/07/2022 08:15 AM    LABALBU 4.7 10/07/2022 08:15 AM    CREATININE 1.38 10/07/2022 08:15 AM    CALCIUM 9.3 10/07/2022 08:15 AM    LABGLOM 44 05/25/2022 10:11 AM    GLUCOSE 87 10/07/2022 08:15 AM     Hepatic Function Panel:    Lab Results   Component Value Date/Time    ALKPHOS 89 10/07/2022 08:15 AM    ALKPHOS 72 03/29/2021 01:17 PM    ALT 13 10/07/2022 08:15 AM    AST 21 10/07/2022 08:15 AM    PROT 6.7 10/07/2022 08:15 AM    BILITOT 0.3 10/07/2022 08:15 AM    BILIDIR <0.2 10/07/2022 08:15 AM    LABALBU 4.7 10/07/2022 08:15 AM     Magnesium:    Lab Results   Component Value Date/Time    MG 2.1 10/07/2022 08:15 AM     Warfarin PT/INR:  No components found for: PTPATWAR, PTINRWAR  HgBA1c:  No results found for: LABA1C  FLP:    Lab Results   Component Value Date/Time    TRIG 79 10/07/2022 08:15 AM    HDL 66 10/07/2022 08:15 AM    LDLCALC 86 10/07/2022 08:15 AM    LABVLDL 16 10/07/2022 08:15 AM     TSH:    Lab Results   Component Value Date/Time    TSH 1.61 10/07/2022 08:15 AM     CONCLUSION:    1. RCA is a dominant vessel with about 10-20% in-stent restenosis and the  distal lesion which is 95% right before the bifurcation of PDA and PLV. 2.  The PDA has a stent which is patent and a large vessel. 3.  PLV has just mild luminal irregularity and is also patent. 4.  Left main is a large vessel which does not demonstrate a critical lesion. 5.  Circumflex, large vessel which is patent. 6. LAD stent is patent with maybe 10% lesion. 7.  Left ventriculography, ejection fraction 60%. 8.  No complication occurred. The patient is to undergo intervention by Dr. Alexandre Salinas of this high-grade lesion in the distal RCA. Bhavna Morales D.O.  10/18/2016       Conclusions      Procedure Summary   Left main patent   LAD stent 10% ins stent re stenosis   Lcx is patent      RCA with 20% in stent re stenosis   Distal RCA has 95% lesion   RCA/PDA/PLV strents are patent      S/P successful PCI of the distal Right Coronary Artery with 3.5-34 mm drug   eluting stent, post dilated up to 3.6 mm.     D: 10/14/2016    FINDINGS:        SHILOH    RIGHT        TONY----->0.96    PTA----->1.08        SHILOH    LEFT        TONY----->0.97    PTA----->1.06     Pressure measurements somewhat limited by vascular calcifications.    2. Judging from waveform analysis, there is no evidence to suggest significant arterial stenosis in either leg. This report has been created using voice recognition software. It may contain minor errors which are inherent in voice recognition technology       Final report electronically signed by Dr. Darling Greer on 10/17/2022 10:07 AM             Conclusions      Summary   Lexiscan EKG stress test is not suggestive for ischemia. The nuclear images is not suggestive for myocardial ischemia. Signatures      ----------------------------------------------------------------   Electronically signed by Luis Friedman MD (Interpreting   Cardiologist) on 10/17/2022 at 18:11   ----------------------------------------------------------------    Conclusions      Summary   Normal left ventricle size and systolic function. Ejection fraction was   estimated at 60 %. There were no regional left ventricular wall motion   abnormalities and wall thickness was within normal limits. The left atrium is Mildly dilated. Signature      ----------------------------------------------------------------   Electronically signed by Luis Friedman MD (Interpreting   physician) on 10/17/2022 at 05:55 PM   ----------------------------------------------------------------    DIARY * Symptom Correlates  Normal sinus rhythm  and Rate     CONCLUSION:  *     Normal Sinus rhythm    Average Heart Rate  65 bpm  Range from  51 bpm to  104 bpm   Rare Premature atrial complexes  Occasional Atrial runs - longest 4 beats at rate of 127 bpm ; and the fastest composed of 3 beats at rate of 162 bpm.  Rare Premature ventricular complexes  No long pause or profound bradycardia  No Supraventricular Tachycardia   No Atrial Fibrillation    Confirmed by Liza Mcfarlane MD, [facility] ([id number]) on 10/27/2022 7:25:55 PM        EKG 5/4/18  Sinus  Bradycardia   Low voltage in precordial leads. ABNORMAL     EKG 11/8/18  NSR, no acute ekg abn    ekg   5/18/2020  Sinus  Bradycardia   Low voltage -possible pulmonary disease.    ABNORMAL     ekg 9/29/2021  Sinus  Bradycardia   Low voltage in precordial leads. -PRWP  ABNORMAL     Ekg 10/3/22  Marked sinus  Bradycardia 49 bpm   Low voltage in precordial leads. ABNORMAL         Assessment     Diagnosis Orders   1. Coronary artery disease involving native coronary artery of native heart without angina pectoris        2. Pure hypercholesterolemia        3. Primary hypertension        4. Hx of Sinus bradycardia resolved after stopped lopressor        5. Abnormal EKG        6. Presence of stent in LAD coronary artery and RCA 40 to 50% as per cath 2012               Plan   The   current meds and labs reviewed    Continue the current treatment and with constant vigilance to changes in symptoms and also any potential side effects. Return for care or seek medical attention immediately if symptoms got worse and/or develop new symptoms. Coronary artery disease, seems to be stable. Denies angina or change in breathing pattern  Sob on exertion   Fatigue better after stop lopressor    Hx of Rt shoulder pain    Hx of Sinus bradycardia resolved after stopped lopressor  Cont  Imdur 60 mg po qd  Cont asa and plavix  Echo and agapito- wnl 10/2022  TSH and Vit d- all with NL  Holter 48 hrs- acceptable after off lopressor 12.5 po bid for  1 weeks    SHILOH and PVR- WNL and leg pain resolved    Pat on antidepressant    Hypertension, on medical treatment. Seems to be under good control. Patient is compliant with medical treatment. Hx of  low  normal BP- resolved lopressor  Off  HCTZ 12.5 mg po qd   No dizziness     Hyperlipidemia: on statins, followed periodically. Patient need periodic lipid and liver profile.   Hyperlipidemia: pat off  Statins, pravastatin, Lipitor for myalgia  HLP  Off statin   , 126 then 237 and then 73  Could not afford PCS K9 repatha  readvised  To be on meds  TOLERATING  crestor 20 mg po qd  LDL went down to 73 from 237 after crestor  Cont  crestor  40 mg po qd     Lipid panel and liver function test before next appointment    Reviewed the records    D/w the pat the plan of care    Advised to come to Er for any recurrence of cp    Discussed use, benefit, and side effects of prescribed medications. All patient questions answered. Pt voiced understanding. Instructed to continue current medications, diet and exercise. Continue risk factor modification and medical management. Patient agreed with treatment plan. Follow up as directed.       I spent 30 minutes involved in face-to-face discussion of medical issues, prognosis, record review  and plan with the patient today and more than 50% of the time was spent on counseling and coordination of care      RTC in 6 months      Cathi Borrero [facility]

## 2022-11-14 RX ORDER — CLOPIDOGREL BISULFATE 75 MG/1
TABLET ORAL
Qty: 90 TABLET | Refills: 1 | Status: SHIPPED | OUTPATIENT
Start: 2022-11-14

## 2023-03-24 RX ORDER — ISOSORBIDE MONONITRATE 60 MG/1
60 TABLET, EXTENDED RELEASE ORAL DAILY
Qty: 90 TABLET | Refills: 1 | Status: SHIPPED | OUTPATIENT
Start: 2023-03-24

## 2023-05-02 ENCOUNTER — OFFICE VISIT (OUTPATIENT)
Dept: CARDIOLOGY CLINIC | Age: 73
End: 2023-05-02
Payer: MEDICARE

## 2023-05-02 VITALS
WEIGHT: 159.6 LBS | HEART RATE: 64 BPM | HEIGHT: 64 IN | BODY MASS INDEX: 27.25 KG/M2 | DIASTOLIC BLOOD PRESSURE: 90 MMHG | SYSTOLIC BLOOD PRESSURE: 147 MMHG

## 2023-05-02 DIAGNOSIS — I10 PRIMARY HYPERTENSION: ICD-10-CM

## 2023-05-02 DIAGNOSIS — R94.31 ABNORMAL EKG: ICD-10-CM

## 2023-05-02 DIAGNOSIS — R00.1 SINUS BRADYCARDIA: ICD-10-CM

## 2023-05-02 DIAGNOSIS — R53.82 CHRONIC FATIGUE: ICD-10-CM

## 2023-05-02 DIAGNOSIS — I25.10 CORONARY ARTERY DISEASE INVOLVING NATIVE CORONARY ARTERY OF NATIVE HEART WITHOUT ANGINA PECTORIS: Primary | ICD-10-CM

## 2023-05-02 DIAGNOSIS — E78.00 PURE HYPERCHOLESTEROLEMIA: ICD-10-CM

## 2023-05-02 DIAGNOSIS — Z95.5 PRESENCE OF STENT IN LAD CORONARY ARTERY: ICD-10-CM

## 2023-05-02 DIAGNOSIS — R06.09 DOE (DYSPNEA ON EXERTION): ICD-10-CM

## 2023-05-02 PROCEDURE — 99214 OFFICE O/P EST MOD 30 MIN: CPT | Performed by: INTERNAL MEDICINE

## 2023-05-02 PROCEDURE — 1123F ACP DISCUSS/DSCN MKR DOCD: CPT | Performed by: INTERNAL MEDICINE

## 2023-05-02 PROCEDURE — 3077F SYST BP >= 140 MM HG: CPT | Performed by: INTERNAL MEDICINE

## 2023-05-02 PROCEDURE — G8427 DOCREV CUR MEDS BY ELIG CLIN: HCPCS | Performed by: INTERNAL MEDICINE

## 2023-05-02 PROCEDURE — 1036F TOBACCO NON-USER: CPT | Performed by: INTERNAL MEDICINE

## 2023-05-02 PROCEDURE — 1090F PRES/ABSN URINE INCON ASSESS: CPT | Performed by: INTERNAL MEDICINE

## 2023-05-02 PROCEDURE — 3080F DIAST BP >= 90 MM HG: CPT | Performed by: INTERNAL MEDICINE

## 2023-05-02 PROCEDURE — G8417 CALC BMI ABV UP PARAM F/U: HCPCS | Performed by: INTERNAL MEDICINE

## 2023-05-02 PROCEDURE — 3017F COLORECTAL CA SCREEN DOC REV: CPT | Performed by: INTERNAL MEDICINE

## 2023-05-02 PROCEDURE — G8400 PT W/DXA NO RESULTS DOC: HCPCS | Performed by: INTERNAL MEDICINE

## 2023-05-02 RX ORDER — HYDROCODONE BITARTRATE AND ACETAMINOPHEN 5; 325 MG/1; MG/1
1 TABLET ORAL EVERY 6 HOURS PRN
COMMUNITY

## 2023-05-02 NOTE — PROGRESS NOTES
Chief Complaint   Patient presents with    6 Month Follow-Up    Coronary Artery Disease    Check-Up   former pat of Dr. Karyna May  Hx of CAD with multiple stent          Pt here for a 6 month f/u    EKG done 10-3-22    Could not tolerate  lopressor 12.5 po bid    Denied chest pain,  dizziness, heart palpitations, or edema    Sob on exertion on stairs-chronic    Off statin due to intolerance and now on repatha    Record reviewed    Hx of mechanical fall and left shoulder pain      Patient Active Problem List   Diagnosis    Hyperlipidemia    Hypothyroidism    Hypertension    Presence of stent in LAD coronary artery and RCA 40 to 50% as per cath 2012     S/P cardiac catheterization    Coronary artery disease involving native coronary artery of native heart without angina pectoris    Abnormal EKG    LEVI (dyspnea on exertion) ONLY FLIGHT OF STAIRS FOR YRS    Chronic fatigue    Hx of Sinus bradycardia resolved after stopped lopressor       Past Surgical History:   Procedure Laterality Date    CARDIAC CATHETERIZATION  2012    CARDIOVASCULAR STRESS TEST      COLECTOMY  01/10/2020    COLECTOMY  01/2020    [facility] with Dr. [place] Briggs  11/2009    EYE SURGERY         Allergies   Allergen Reactions    Requip [Ropinirole Hcl]     Statins         Family History   Problem Relation Age of Onset    Heart Disease Mother     Kidney Disease Mother     Heart Disease Father     Cancer Father     Heart Disease Brother     Diabetes Brother         Social History     Socioeconomic History    Marital status:      Spouse name: Not on file    Number of children: Not on file    Years of education: Not on file    Highest education level: Not on file   Occupational History    Occupation:    Tobacco Use    Smoking status: Passive Smoke Exposure - Never Smoker    Smokeless tobacco: Never   Vaping Use    Vaping Use: Never used   Substance and Sexual Activity    Alcohol use:  No

## 2023-05-05 LAB
ALBUMIN SERPL-MCNC: 4.7 G/DL (ref 3.5–5.2)
ALK PHOSPHATASE: 92 U/L (ref 40–142)
ALT SERPL-CCNC: 23 U/L (ref 5–40)
AST SERPL-CCNC: 30 U/L (ref 9–40)
BILIRUB SERPL-MCNC: 0.4 MG/DL
BILIRUBIN DIRECT: <0.2 MG/DL (ref 0–0.3)
CHOLESTEROL/HDL RATIO: 3.3 RATIO
CHOLESTEROL: 166 MG/DL
HDLC SERPL-MCNC: 51 MG/DL
LDL CHOLESTEROL CALCULATED: 81 MG/DL
LDL/HDL RATIO: 1.6 RATIO
TOTAL PROTEIN: 7.1 G/DL (ref 6.1–8.3)
TRIGL SERPL-MCNC: 171 MG/DL
VLDLC SERPL CALC-MCNC: 34 MG/DL

## 2023-05-12 ENCOUNTER — TELEPHONE (OUTPATIENT)
Dept: CARDIOLOGY CLINIC | Age: 73
End: 2023-05-12

## 2023-05-26 ENCOUNTER — TELEPHONE (OUTPATIENT)
Dept: CARDIOLOGY CLINIC | Age: 73
End: 2023-05-26

## 2023-05-30 NOTE — TELEPHONE ENCOUNTER
Pt called back in reporting BP readings this afternoon of 178/107 and 176/111; pt was provided ED warning signs.     Recommended to patient that she follow up with midlevel provider to adjust medications to better control BP; pt agreeable and appt scheduled 6-1-23

## 2023-05-30 NOTE — TELEPHONE ENCOUNTER
Spoke with pt to get update on how she is doing. Pt stated she did not report to ED last Friday as recommended, but chose to take Lopressor 12.5mg once (pt has this from previously d/c script) and stated she felt better afterwards and BP dropped/symptoms resolved. Pt stated BP this AM was 166/86 and denies any cardiac symptoms. Pt stated she is currently having some pain related to previous fall 5 weeks ago. Pt wanted to see if she needed to adjust any medications from Dr. Kauffman Courser stand point.

## 2023-05-31 NOTE — TELEPHONE ENCOUNTER
TRY nORVASC 5 MG PO QD  SKIP DOSE FOR DAY FOR  SBP < 130 MMHG  ( CAUTION PAT HX OF LOW NORMAL BP SBP OF 98 AND HCTZ 12.5 STOPPED JAN 2022)

## 2023-05-31 NOTE — PROGRESS NOTES
[facility] PROFESSIONAL SERVICES  [facility]   [address]   [address] [address]   Dept: [phone]   Dept Fax: [phone]   Loc: [phone]      Chief Complaint   Patient presents with    Follow-up     HTN BP ISSUES      Cardiologist:  Dr. Hendricks Mention  81 yo female presents for 4 week f/u for leg heaviness. Hx of CAD s/p PCI, HTN, HLD. HEADACHES, BP RUNNING 160-170s. Some foggy/blurry vision, usually when getting up too quickly. Denies cp or sob. Gets some fatigue. Metoprolol drops her BP significantly. Has been on lopressor 12.5 mg BID previously. General:   No fever, no chills, no weight loss, no fatigue  Pulmonary:    no dyspnea, no wheezing  Cardiac:    Denies recent chest pain   GI:     No nausea or vomiting, no abdominal pain  Neuro:      No dizziness or light headedness  Musculoskeletal:  no leg weakness  Extremities:   No edema      Past Medical History:   Diagnosis Date    Arthritis     CAD (coronary artery disease)     Hyperlipidemia     Hypertension     Hypothyroidism     S/P coronary artery stent placement 8/14/15    proximal RCA 3.5x33mm Drug Eluting Stent    S/P coronary artery stent placement 8/14/15    mid PDA 2.5x15mm Drug Eluting Stent    S/P coronary artery stent placement 8/14/15    mid PLV 2.5x18mm Drug Eluting Stent    S/P coronary artery stent placement 2010    LAD    S/P coronary artery stent placement 2010       Allergies   Allergen Reactions    Requip [Ropinirole Hcl]     Statins        Current Outpatient Medications   Medication Sig Dispense Refill    HYDROcodone-acetaminophen (NORCO) 5-325 MG per tablet Take 1 tablet by mouth every 6 hours as needed for Pain.       Evolocumab 140 MG/ML SOAJ Inject 1 pen into skin every 2 weeks 6 Adjustable Dose Pre-filled Pen Syringe 3    isosorbide mononitrate (IMDUR) 60 MG extended release tablet TAKE 1 TABLET BY MOUTH DAILY 90 tablet 1    clopidogrel (PLAVIX) 75 MG tablet TAKE 1 TABLET BY MOUTH DAILY AS

## 2023-06-01 ENCOUNTER — OFFICE VISIT (OUTPATIENT)
Dept: CARDIOLOGY CLINIC | Age: 73
End: 2023-06-01
Payer: MEDICARE

## 2023-06-01 VITALS
BODY MASS INDEX: 26.56 KG/M2 | WEIGHT: 155.6 LBS | HEART RATE: 67 BPM | HEIGHT: 64 IN | SYSTOLIC BLOOD PRESSURE: 156 MMHG | DIASTOLIC BLOOD PRESSURE: 98 MMHG

## 2023-06-01 DIAGNOSIS — I10 PRIMARY HYPERTENSION: Primary | ICD-10-CM

## 2023-06-01 DIAGNOSIS — I25.10 CORONARY ARTERY DISEASE INVOLVING NATIVE CORONARY ARTERY OF NATIVE HEART WITHOUT ANGINA PECTORIS: ICD-10-CM

## 2023-06-01 PROCEDURE — 3080F DIAST BP >= 90 MM HG: CPT | Performed by: STUDENT IN AN ORGANIZED HEALTH CARE EDUCATION/TRAINING PROGRAM

## 2023-06-01 PROCEDURE — G8417 CALC BMI ABV UP PARAM F/U: HCPCS | Performed by: STUDENT IN AN ORGANIZED HEALTH CARE EDUCATION/TRAINING PROGRAM

## 2023-06-01 PROCEDURE — 99214 OFFICE O/P EST MOD 30 MIN: CPT | Performed by: STUDENT IN AN ORGANIZED HEALTH CARE EDUCATION/TRAINING PROGRAM

## 2023-06-01 PROCEDURE — 1036F TOBACCO NON-USER: CPT | Performed by: STUDENT IN AN ORGANIZED HEALTH CARE EDUCATION/TRAINING PROGRAM

## 2023-06-01 PROCEDURE — 1123F ACP DISCUSS/DSCN MKR DOCD: CPT | Performed by: STUDENT IN AN ORGANIZED HEALTH CARE EDUCATION/TRAINING PROGRAM

## 2023-06-01 PROCEDURE — 3077F SYST BP >= 140 MM HG: CPT | Performed by: STUDENT IN AN ORGANIZED HEALTH CARE EDUCATION/TRAINING PROGRAM

## 2023-06-01 PROCEDURE — 1090F PRES/ABSN URINE INCON ASSESS: CPT | Performed by: STUDENT IN AN ORGANIZED HEALTH CARE EDUCATION/TRAINING PROGRAM

## 2023-06-01 PROCEDURE — 3017F COLORECTAL CA SCREEN DOC REV: CPT | Performed by: STUDENT IN AN ORGANIZED HEALTH CARE EDUCATION/TRAINING PROGRAM

## 2023-06-01 PROCEDURE — G8400 PT W/DXA NO RESULTS DOC: HCPCS | Performed by: STUDENT IN AN ORGANIZED HEALTH CARE EDUCATION/TRAINING PROGRAM

## 2023-06-01 PROCEDURE — G8427 DOCREV CUR MEDS BY ELIG CLIN: HCPCS | Performed by: STUDENT IN AN ORGANIZED HEALTH CARE EDUCATION/TRAINING PROGRAM

## 2023-06-01 RX ORDER — METOPROLOL SUCCINATE 25 MG/1
12.5 TABLET, EXTENDED RELEASE ORAL NIGHTLY
Qty: 30 TABLET | Refills: 3 | Status: SHIPPED | OUTPATIENT
Start: 2023-06-01

## 2023-06-01 NOTE — PATIENT INSTRUCTIONS
Try toprol 12.5 mg at night. You may receive a survey regarding the care you received during your visit. Your input is valuable to us. We encourage you to complete and return your survey. We hope you will choose us in the future for your healthcare needs.

## 2023-09-25 RX ORDER — METOPROLOL SUCCINATE 25 MG/1
TABLET, EXTENDED RELEASE ORAL
Qty: 45 TABLET | Refills: 0 | Status: SHIPPED | OUTPATIENT
Start: 2023-09-25

## 2023-10-19 RX ORDER — METOPROLOL SUCCINATE 25 MG/1
TABLET, EXTENDED RELEASE ORAL
Qty: 45 TABLET | Refills: 0 | Status: SHIPPED | OUTPATIENT
Start: 2023-10-19

## 2023-10-31 ENCOUNTER — OFFICE VISIT (OUTPATIENT)
Dept: CARDIOLOGY CLINIC | Age: 73
End: 2023-10-31
Payer: MEDICARE

## 2023-10-31 VITALS
HEART RATE: 67 BPM | DIASTOLIC BLOOD PRESSURE: 79 MMHG | SYSTOLIC BLOOD PRESSURE: 119 MMHG | BODY MASS INDEX: 26.98 KG/M2 | WEIGHT: 158 LBS | HEIGHT: 64 IN

## 2023-10-31 DIAGNOSIS — E78.00 PURE HYPERCHOLESTEROLEMIA: ICD-10-CM

## 2023-10-31 DIAGNOSIS — R94.31 ABNORMAL EKG: ICD-10-CM

## 2023-10-31 DIAGNOSIS — I25.10 CORONARY ARTERY DISEASE INVOLVING NATIVE CORONARY ARTERY OF NATIVE HEART WITHOUT ANGINA PECTORIS: Primary | ICD-10-CM

## 2023-10-31 DIAGNOSIS — Z95.5 PRESENCE OF STENT IN LAD CORONARY ARTERY: ICD-10-CM

## 2023-10-31 DIAGNOSIS — R00.1 SINUS BRADYCARDIA: ICD-10-CM

## 2023-10-31 DIAGNOSIS — I10 PRIMARY HYPERTENSION: ICD-10-CM

## 2023-10-31 PROCEDURE — G8400 PT W/DXA NO RESULTS DOC: HCPCS | Performed by: INTERNAL MEDICINE

## 2023-10-31 PROCEDURE — 1123F ACP DISCUSS/DSCN MKR DOCD: CPT | Performed by: INTERNAL MEDICINE

## 2023-10-31 PROCEDURE — 3074F SYST BP LT 130 MM HG: CPT | Performed by: INTERNAL MEDICINE

## 2023-10-31 PROCEDURE — 3078F DIAST BP <80 MM HG: CPT | Performed by: INTERNAL MEDICINE

## 2023-10-31 PROCEDURE — G8417 CALC BMI ABV UP PARAM F/U: HCPCS | Performed by: INTERNAL MEDICINE

## 2023-10-31 PROCEDURE — G8427 DOCREV CUR MEDS BY ELIG CLIN: HCPCS | Performed by: INTERNAL MEDICINE

## 2023-10-31 PROCEDURE — 1090F PRES/ABSN URINE INCON ASSESS: CPT | Performed by: INTERNAL MEDICINE

## 2023-10-31 PROCEDURE — 99214 OFFICE O/P EST MOD 30 MIN: CPT | Performed by: INTERNAL MEDICINE

## 2023-10-31 PROCEDURE — 93000 ELECTROCARDIOGRAM COMPLETE: CPT | Performed by: INTERNAL MEDICINE

## 2023-10-31 PROCEDURE — 3017F COLORECTAL CA SCREEN DOC REV: CPT | Performed by: INTERNAL MEDICINE

## 2023-10-31 PROCEDURE — 1036F TOBACCO NON-USER: CPT | Performed by: INTERNAL MEDICINE

## 2023-10-31 PROCEDURE — G8484 FLU IMMUNIZE NO ADMIN: HCPCS | Performed by: INTERNAL MEDICINE

## 2023-10-31 RX ORDER — CLOPIDOGREL BISULFATE 75 MG/1
75 TABLET ORAL DAILY
Qty: 90 TABLET | Refills: 3 | Status: SHIPPED | OUTPATIENT
Start: 2023-10-31

## 2023-10-31 RX ORDER — ISOSORBIDE MONONITRATE 60 MG/1
60 TABLET, EXTENDED RELEASE ORAL DAILY
Qty: 90 TABLET | Refills: 3 | Status: SHIPPED | OUTPATIENT
Start: 2023-10-31

## 2023-10-31 NOTE — PROGRESS NOTES
use: No    Sexual activity: Not on file   Other Topics Concern    Not on file   Social History Narrative    Not on file     Social Determinants of Health     Financial Resource Strain: Not on file   Food Insecurity: Not on file   Transportation Needs: Not on file   Physical Activity: Not on file   Stress: Not on file   Social Connections: Not on file   Intimate Partner Violence: Not on file   Housing Stability: Not on file       Current Outpatient Medications   Medication Sig Dispense Refill    isosorbide mononitrate (IMDUR) 60 MG extended release tablet Take 1 tablet by mouth daily 90 tablet 3    clopidogrel (PLAVIX) 75 MG tablet Take 1 tablet by mouth daily 90 tablet 3    metoprolol succinate (TOPROL XL) 25 MG extended release tablet TAKE ONE HALF TAB EVERY NIGHT AT BEDTIME 45 tablet 0    HYDROcodone-acetaminophen (NORCO) 5-325 MG per tablet Take 1 tablet by mouth every 6 hours as needed for Pain. Evolocumab 140 MG/ML SOAJ Inject 1 pen into skin every 2 weeks 6 Adjustable Dose Pre-filled Pen Syringe 3    nitroGLYCERIN (NITROSTAT) 0.4 MG SL tablet Place 1 tablet under the tongue every 5 minutes as needed for Chest pain 25 tablet 3    levothyroxine (SYNTHROID) 100 MCG tablet 88 mcg       traZODone (DESYREL) 50 MG tablet Take 0.5 tablets by mouth nightly      citalopram (CELEXA) 40 MG tablet Take 1 tablet by mouth daily      aspirin 81 MG EC tablet Take 1 tablet by mouth daily 30 tablet 0    temazepam (RESTORIL) 15 MG capsule Take 1 capsule by mouth nightly as needed for Sleep. No current facility-administered medications for this visit. Review of Systems -     General ROS: negative  Psychological ROS: negative  Hematological and Lymphatic ROS: No history of blood clots or bleeding disorder.    Respiratory ROS: no cough, shortness of breath, or wheezing  Cardiovascular ROS: see HPI  Gastrointestinal ROS: negative  Genito-Urinary ROS: no dysuria, trouble voiding, or hematuria  Musculoskeletal ROS:

## 2023-11-09 ENCOUNTER — TELEPHONE (OUTPATIENT)
Dept: CARDIOLOGY CLINIC | Age: 73
End: 2023-11-09

## 2023-11-09 LAB
ALBUMIN SERPL-MCNC: 4.4 G/DL (ref 3.5–5.2)
ALK PHOSPHATASE: 105 U/L (ref 40–142)
ALT SERPL-CCNC: 14 U/L (ref 5–40)
AST SERPL-CCNC: 19 U/L (ref 9–40)
BILIRUB SERPL-MCNC: 0.2 MG/DL
BILIRUBIN DIRECT: <0.2 MG/DL (ref 0–0.3)
CHOLESTEROL/HDL RATIO: 4.5 RATIO
CHOLESTEROL: 236 MG/DL
HDLC SERPL-MCNC: 52 MG/DL
LDL CHOLESTEROL CALCULATED: 146 MG/DL
LDL/HDL RATIO: 2.8 RATIO
TOTAL PROTEIN: 6.7 G/DL (ref 6.1–8.3)
TRIGL SERPL-MCNC: 190 MG/DL
VLDLC SERPL CALC-MCNC: 38 MG/DL

## 2023-11-09 NOTE — TELEPHONE ENCOUNTER
[facility] calling requesting most recent chart notes and lipid panel for Repatha renewal.  Faxed to [phone].

## 2023-11-14 ENCOUNTER — TELEPHONE (OUTPATIENT)
Dept: CARDIOLOGY CLINIC | Age: 73
End: 2023-11-14

## 2023-11-14 NOTE — TELEPHONE ENCOUNTER
PA for Repatha started under [facility].     Message from Plan  PA Case: [id number], Status: Approved, Coverage Starts on: 8/15/2023 12:00:00 AM, Coverage Ends on: 11/13/2024 12:00:00 AM.

## 2024-01-16 NOTE — TELEPHONE ENCOUNTER
Next OV: 04/30/2024  Script pended.    no rashes , no suspicious lesions , no areas of discoloration , no jaundice present , good turgor , no masses , no tenderness on palpation

## 2024-01-17 RX ORDER — METOPROLOL SUCCINATE 25 MG/1
TABLET, EXTENDED RELEASE ORAL
Qty: 45 TABLET | Refills: 2 | Status: SHIPPED | OUTPATIENT
Start: 2024-01-17

## 2024-01-26 ENCOUNTER — TELEPHONE (OUTPATIENT)
Dept: CARDIOLOGY CLINIC | Age: 74
End: 2024-01-26

## 2024-01-26 NOTE — TELEPHONE ENCOUNTER
Pt called and requests records be mailed from the past 2 years so she can get a supplemental insurance plan. Records printed and put in mail 1/26/24

## 2024-04-30 ENCOUNTER — OFFICE VISIT (OUTPATIENT)
Dept: CARDIOLOGY CLINIC | Age: 74
End: 2024-04-30
Payer: MEDICARE

## 2024-04-30 VITALS
BODY MASS INDEX: 27.08 KG/M2 | HEART RATE: 60 BPM | WEIGHT: 158.6 LBS | HEIGHT: 64 IN | DIASTOLIC BLOOD PRESSURE: 79 MMHG | SYSTOLIC BLOOD PRESSURE: 117 MMHG

## 2024-04-30 DIAGNOSIS — E78.00 PURE HYPERCHOLESTEROLEMIA: ICD-10-CM

## 2024-04-30 DIAGNOSIS — I25.10 CORONARY ARTERY DISEASE INVOLVING NATIVE CORONARY ARTERY OF NATIVE HEART WITHOUT ANGINA PECTORIS: Primary | ICD-10-CM

## 2024-04-30 DIAGNOSIS — I73.9 INTERMITTENT CLAUDICATION (HCC): ICD-10-CM

## 2024-04-30 DIAGNOSIS — I10 PRIMARY HYPERTENSION: ICD-10-CM

## 2024-04-30 DIAGNOSIS — R42 DIZZINESS ON STANDING: ICD-10-CM

## 2024-04-30 DIAGNOSIS — Z95.5 PRESENCE OF STENT IN LAD CORONARY ARTERY: ICD-10-CM

## 2024-04-30 DIAGNOSIS — R13.10 ODYNOPHAGIA: ICD-10-CM

## 2024-04-30 DIAGNOSIS — Z01.818 PRE-OP EVALUATION: ICD-10-CM

## 2024-04-30 PROCEDURE — G8427 DOCREV CUR MEDS BY ELIG CLIN: HCPCS | Performed by: INTERNAL MEDICINE

## 2024-04-30 PROCEDURE — G8400 PT W/DXA NO RESULTS DOC: HCPCS | Performed by: INTERNAL MEDICINE

## 2024-04-30 PROCEDURE — 99214 OFFICE O/P EST MOD 30 MIN: CPT | Performed by: INTERNAL MEDICINE

## 2024-04-30 PROCEDURE — 1123F ACP DISCUSS/DSCN MKR DOCD: CPT | Performed by: INTERNAL MEDICINE

## 2024-04-30 PROCEDURE — 93000 ELECTROCARDIOGRAM COMPLETE: CPT | Performed by: INTERNAL MEDICINE

## 2024-04-30 PROCEDURE — 3078F DIAST BP <80 MM HG: CPT | Performed by: INTERNAL MEDICINE

## 2024-04-30 PROCEDURE — 1036F TOBACCO NON-USER: CPT | Performed by: INTERNAL MEDICINE

## 2024-04-30 PROCEDURE — 3017F COLORECTAL CA SCREEN DOC REV: CPT | Performed by: INTERNAL MEDICINE

## 2024-04-30 PROCEDURE — 3074F SYST BP LT 130 MM HG: CPT | Performed by: INTERNAL MEDICINE

## 2024-04-30 PROCEDURE — 1090F PRES/ABSN URINE INCON ASSESS: CPT | Performed by: INTERNAL MEDICINE

## 2024-04-30 PROCEDURE — G8417 CALC BMI ABV UP PARAM F/U: HCPCS | Performed by: INTERNAL MEDICINE

## 2024-04-30 RX ORDER — NITROGLYCERIN 0.4 MG/1
0.4 TABLET SUBLINGUAL EVERY 5 MIN PRN
Qty: 25 TABLET | Refills: 3 | Status: SHIPPED | OUTPATIENT
Start: 2024-04-30

## 2024-04-30 NOTE — PROGRESS NOTES
advised  Pat is to have colonoscopy and may proceed  But pat need EGD asap and advised to talk to her doctor doing colonoscopy and her pcp    Hx of Rt shoulder pain    Hx of Sinus bradycardia resolved after stopped lopressor  Cont  Imdur 60 mg po qd  Cont asa and plavix  Echo and agapito- wnl 10/2022  TSH and Vit d- all with NL  Holter 48 hrs- acceptable after off lopressor 12.5 po bid for  1 weeks  Tolerating toprol xl 12.5 po qd and HR good    SHILOH and PVR- WNL and leg pain resolved    Pat on antidepressant    Hypertension, on medical treatment. Seems to be under good control. Patient is compliant with medical treatment.   Hx of  low  normal BP- resolved lopressor  Off  HCTZ 12.5 mg po qd   No dizziness     Hyperlipidemia: on statins, followed periodically. Patient need periodic lipid and liver profile.  Hyperlipidemia: pat off  Statins, pravastatin, Lipitor for myalgia  HLP  Off statin   , 126 then 237 and then 73 and thenm 86  Developing agin with myalgia from crestor 20 mg po qd  LDL went down to 73 from 237 after crestor  Off crestor and myalgia resolved  Off  crestor  20 mg po qd   Cont repatha-PCSK9  started June 2023      Intermittent claudication (HCC) in the calf and better with rest and comes with walking  worsening for the last 2 yrs  SHILOH and seg okay 2022  Arterial duplex to R/o PAD    Lipid panel and liver function test asap    Reviewed the records    D/w the pat the plan of care    Lipid panel and liver function test before next appointment      Discussed use, benefit, and side effects of prescribed medications. All patient questions answered. Pt voiced understanding. Instructed to continue current medications, diet and exercise. Continue risk factor modification and medical management. Patient agreed with treatment plan. Follow up as directed.        The patient is advised to attempt to improve diet and exercise patterns to aid in medical management of this problem.  Advised more plant based

## 2024-05-06 ENCOUNTER — HOSPITAL ENCOUNTER (OUTPATIENT)
Dept: INTERVENTIONAL RADIOLOGY/VASCULAR | Age: 74
Discharge: HOME | End: 2024-05-08
Attending: INTERNAL MEDICINE
Payer: MEDICARE

## 2024-05-06 DIAGNOSIS — E78.00 PURE HYPERCHOLESTEROLEMIA: ICD-10-CM

## 2024-05-06 DIAGNOSIS — I25.10 CORONARY ARTERY DISEASE INVOLVING NATIVE CORONARY ARTERY OF NATIVE HEART WITHOUT ANGINA PECTORIS: ICD-10-CM

## 2024-05-06 DIAGNOSIS — I73.9 INTERMITTENT CLAUDICATION (HCC): ICD-10-CM

## 2024-05-06 PROCEDURE — 93925 LOWER EXTREMITY STUDY: CPT

## 2024-05-09 RX ORDER — EVOLOCUMAB 140 MG/ML
INJECTION, SOLUTION SUBCUTANEOUS
Qty: 6 ML | Refills: 3 | Status: SHIPPED | OUTPATIENT
Start: 2024-05-09

## 2024-05-22 LAB
ALBUMIN: 4.5 G/DL (ref 3.5–5.2)
ALK PHOSPHATASE: 99 U/L (ref 40–142)
ALT SERPL-CCNC: 24 U/L (ref 5–40)
AST SERPL-CCNC: 26 U/L (ref 9–40)
BILIRUB SERPL-MCNC: 0.4 MG/DL
BILIRUBIN DIRECT: <0.2 MG/DL (ref 0–0.3)
CHOLESTEROL, TOTAL: 229 MG/DL
CHOLESTEROL/HDL RATIO: 4.4 RATIO
HDLC SERPL-MCNC: 52 MG/DL
LDL CHOLESTEROL: 142 MG/DL
LDL/HDL RATIO: 2.7 RATIO
TOTAL PROTEIN: 7 G/DL (ref 6.1–8.3)
TRIGL SERPL-MCNC: 174 MG/DL
VLDLC SERPL CALC-MCNC: 35 MG/DL

## 2024-05-30 PROBLEM — Z01.818 PRE-OP EVALUATION: Status: RESOLVED | Noted: 2024-04-30 | Resolved: 2024-05-30

## 2024-10-15 RX ORDER — ISOSORBIDE MONONITRATE 60 MG/1
60 TABLET, EXTENDED RELEASE ORAL DAILY
Qty: 90 TABLET | Refills: 3 | Status: SHIPPED | OUTPATIENT
Start: 2024-10-15

## 2024-10-30 ENCOUNTER — OFFICE VISIT (OUTPATIENT)
Dept: CARDIOLOGY CLINIC | Age: 74
End: 2024-10-30

## 2024-10-30 VITALS
SYSTOLIC BLOOD PRESSURE: 135 MMHG | WEIGHT: 158 LBS | DIASTOLIC BLOOD PRESSURE: 88 MMHG | HEART RATE: 58 BPM | BODY MASS INDEX: 26.98 KG/M2 | HEIGHT: 64 IN

## 2024-10-30 DIAGNOSIS — E78.00 PURE HYPERCHOLESTEROLEMIA: ICD-10-CM

## 2024-10-30 DIAGNOSIS — I10 PRIMARY HYPERTENSION: ICD-10-CM

## 2024-10-30 DIAGNOSIS — Z95.5 PRESENCE OF STENT IN LAD CORONARY ARTERY: ICD-10-CM

## 2024-10-30 DIAGNOSIS — R94.31 ABNORMAL EKG: ICD-10-CM

## 2024-10-30 DIAGNOSIS — I25.10 CORONARY ARTERY DISEASE INVOLVING NATIVE CORONARY ARTERY OF NATIVE HEART WITHOUT ANGINA PECTORIS: Primary | ICD-10-CM

## 2024-10-30 RX ORDER — MULTIVITAMIN WITH IRON
1 TABLET ORAL
COMMUNITY

## 2024-10-30 NOTE — PROGRESS NOTES
Chief Complaint   Patient presents with    Follow-up     6 month follow up.   former pat of Dr. Sosa  Hx of CAD with multiple stent            6 month follow up.    Last EKG done on 04/30/2024.    Denied chest pain,   dizziness, heart palpitations, or edema    Sob on exertion on stairs-chronic    Off statin due to intolerance and now on repatha    Record reviewed    Hx of mechanical fall and left shoulder pain      PMHX   Hx of  odynophagia-  and had EGD and now on protonix  Could not tolerate  lopressor 12.5 po bid  Drink only less than 8 oz    Hx of Occasional dizziness - on bending at  time with standing  Hx of balance issue        Patient Active Problem List   Diagnosis    Hyperlipidemia    Hypothyroidism    Hypertension    Presence of stent in LAD coronary artery and RCA 40 to 50% as per cath 2012     S/P cardiac catheterization    Coronary artery disease involving native coronary artery of native heart without angina pectoris    Abnormal EKG    LEVI (dyspnea on exertion) ONLY FLIGHT OF STAIRS FOR YRS    Chronic fatigue    Hx of Sinus bradycardia resolved after stopped lopressor    Dizziness on standing    Odynophagia    Intermittent claudication (HCC) in the calf and better with rest and comes with walking       Past Surgical History:   Procedure Laterality Date    CARDIAC CATHETERIZATION  2012    CARDIOVASCULAR STRESS TEST      COLECTOMY  01/10/2020    COLECTOMY  01/2020    [facility] with Dr. Nicholson    CORONARY ANGIOPLASTY WITH STENT PLACEMENT  11/2009    EYE SURGERY         Allergies   Allergen Reactions    Requip [Ropinirole Hcl]     Statins         Family History   Problem Relation Age of Onset    Heart Disease Mother     Kidney Disease Mother     Heart Disease Father     Cancer Father     Heart Disease Brother     Diabetes Brother         Social History     Socioeconomic History    Marital status:      Spouse name: Not on file    Number of children: Not on file    Years of education: Not

## 2025-01-03 RX ORDER — CLOPIDOGREL BISULFATE 75 MG/1
75 TABLET ORAL DAILY
Qty: 90 TABLET | Refills: 2 | Status: SHIPPED | OUTPATIENT
Start: 2025-01-03

## 2025-02-04 ENCOUNTER — TELEPHONE (OUTPATIENT)
Dept: CARDIOLOGY CLINIC | Age: 75
End: 2025-02-04

## 2025-02-04 NOTE — TELEPHONE ENCOUNTER
Pre op Risk Assessment    Procedure EGD with Possible Dilation  Physician Dr Kaur  Date of surgery/procedure 04/23/2025    Last OV 10/30/2024  Last Stress 10/17/2022  Last Echo 10/17/2022  Last Cath 10/14/2016  Last Stent 10/14/2016  Is patient on blood thinners Aspirin and Plavix  Hold Meds/how many days Aspirin and Plavix for 7 days      Fax: [phone]

## 2025-04-29 RX ORDER — EVOLOCUMAB 140 MG/ML
INJECTION, SOLUTION SUBCUTANEOUS
Qty: 6 ML | Refills: 0 | Status: SHIPPED | OUTPATIENT
Start: 2025-04-29

## 2025-05-03 LAB
ALBUMIN: 4.1 G/DL (ref 3.5–5.2)
ALK PHOSPHATASE: 95 U/L (ref 30–146)
ALT SERPL-CCNC: 16 U/L (ref 5–33)
ANION GAP SERPL CALCULATED.3IONS-SCNC: 12 MMOL/L (ref 7–16)
AST SERPL-CCNC: 18 U/L (ref 9–40)
BILIRUB SERPL-MCNC: 0.2 MG/DL
BILIRUBIN DIRECT: <0.2 MG/DL
BUN BLDV-MCNC: 23 MG/DL (ref 8–23)
CALCIUM SERPL-MCNC: 9.1 MG/DL (ref 8.6–10.5)
CHLORIDE BLD-SCNC: 106 MMOL/L (ref 96–107)
CHOLESTEROL, TOTAL: 244 MG/DL (ref 100–199)
CHOLESTEROL/HDL RATIO: 5.4 (ref 2–4.5)
CO2: 25 MMOL/L (ref 18–32)
CREAT SERPL-MCNC: 1.67 MG/DL (ref 0.51–1.15)
EGFR IF NONAFRICAN AMERICAN: 32 ML/MIN/1.73M2
GLUCOSE: 84 MG/DL (ref 70–100)
HDLC SERPL-MCNC: 45 MG/DL
LDL CHOLESTEROL: 150 MG/DL
LDL/HDL RATIO: 3.3
MAGNESIUM: 2.3 MG/DL (ref 1.6–2.6)
POTASSIUM SERPL-SCNC: 4.6 MMOL/L (ref 3.5–5.4)
SODIUM BLD-SCNC: 143 MMOL/L (ref 135–148)
TOTAL PROTEIN: 6.6 G/DL (ref 6–8.3)
TRIGL SERPL-MCNC: 247 MG/DL (ref 20–149)
VLDLC SERPL CALC-MCNC: 49 MG/DL

## 2025-05-07 ENCOUNTER — OFFICE VISIT (OUTPATIENT)
Dept: CARDIOLOGY CLINIC | Age: 75
End: 2025-05-07
Payer: MEDICARE

## 2025-05-07 VITALS
HEART RATE: 55 BPM | SYSTOLIC BLOOD PRESSURE: 117 MMHG | WEIGHT: 162 LBS | DIASTOLIC BLOOD PRESSURE: 80 MMHG | HEIGHT: 64 IN | BODY MASS INDEX: 27.66 KG/M2

## 2025-05-07 DIAGNOSIS — R06.02 SHORTNESS OF BREATH: Primary | ICD-10-CM

## 2025-05-07 DIAGNOSIS — I10 PRIMARY HYPERTENSION: ICD-10-CM

## 2025-05-07 DIAGNOSIS — E78.00 PURE HYPERCHOLESTEROLEMIA: ICD-10-CM

## 2025-05-07 DIAGNOSIS — R00.1 SINUS BRADYCARDIA: ICD-10-CM

## 2025-05-07 DIAGNOSIS — R94.31 ABNORMAL EKG: ICD-10-CM

## 2025-05-07 DIAGNOSIS — Z95.5 PRESENCE OF STENT IN LAD CORONARY ARTERY: ICD-10-CM

## 2025-05-07 DIAGNOSIS — I25.10 CORONARY ARTERY DISEASE INVOLVING NATIVE CORONARY ARTERY OF NATIVE HEART WITHOUT ANGINA PECTORIS: ICD-10-CM

## 2025-05-07 PROBLEM — R42 DIZZINESS ON STANDING: Status: RESOLVED | Noted: 2024-04-30 | Resolved: 2025-05-07

## 2025-05-07 PROCEDURE — 1123F ACP DISCUSS/DSCN MKR DOCD: CPT | Performed by: INTERNAL MEDICINE

## 2025-05-07 PROCEDURE — G8400 PT W/DXA NO RESULTS DOC: HCPCS | Performed by: INTERNAL MEDICINE

## 2025-05-07 PROCEDURE — 1036F TOBACCO NON-USER: CPT | Performed by: INTERNAL MEDICINE

## 2025-05-07 PROCEDURE — G8427 DOCREV CUR MEDS BY ELIG CLIN: HCPCS | Performed by: INTERNAL MEDICINE

## 2025-05-07 PROCEDURE — 1160F RVW MEDS BY RX/DR IN RCRD: CPT | Performed by: INTERNAL MEDICINE

## 2025-05-07 PROCEDURE — 3074F SYST BP LT 130 MM HG: CPT | Performed by: INTERNAL MEDICINE

## 2025-05-07 PROCEDURE — 1090F PRES/ABSN URINE INCON ASSESS: CPT | Performed by: INTERNAL MEDICINE

## 2025-05-07 PROCEDURE — 1159F MED LIST DOCD IN RCRD: CPT | Performed by: INTERNAL MEDICINE

## 2025-05-07 PROCEDURE — G8417 CALC BMI ABV UP PARAM F/U: HCPCS | Performed by: INTERNAL MEDICINE

## 2025-05-07 PROCEDURE — 3017F COLORECTAL CA SCREEN DOC REV: CPT | Performed by: INTERNAL MEDICINE

## 2025-05-07 PROCEDURE — 3079F DIAST BP 80-89 MM HG: CPT | Performed by: INTERNAL MEDICINE

## 2025-05-07 PROCEDURE — 93000 ELECTROCARDIOGRAM COMPLETE: CPT | Performed by: INTERNAL MEDICINE

## 2025-05-07 PROCEDURE — 99214 OFFICE O/P EST MOD 30 MIN: CPT | Performed by: INTERNAL MEDICINE

## 2025-05-07 RX ORDER — FAMOTIDINE 20 MG/1
20 TABLET, FILM COATED ORAL
COMMUNITY
Start: 2024-05-10

## 2025-05-07 NOTE — PROGRESS NOTES
CREATININE 1.67 05/02/2025 08:07 AM    CALCIUM 9.1 05/02/2025 08:07 AM    LABGLOM 44 05/25/2022 10:11 AM    GLUCOSE 84 05/02/2025 08:07 AM     Hepatic Function Panel:    Lab Results   Component Value Date/Time    ALKPHOS 95 05/02/2025 08:07 AM    ALKPHOS 72 03/29/2021 01:17 PM    ALT 16 05/02/2025 08:07 AM    AST 18 05/02/2025 08:07 AM    BILITOT 0.2 05/02/2025 08:07 AM    BILIDIR <0.2 05/02/2025 08:07 AM     Magnesium:    Lab Results   Component Value Date/Time    MG 2.3 05/02/2025 08:07 AM     Warfarin PT/INR:  No components found for: \"PTPATWAR\", \"PTINRWAR\"  HgBA1c:  No results found for: \"LABA1C\"  FLP:    Lab Results   Component Value Date/Time    TRIG 247 05/02/2025 08:07 AM    HDL 45 05/02/2025 08:07 AM     TSH:    Lab Results   Component Value Date/Time    TSH 1.61 10/07/2022 08:15 AM     CONCLUSION:    1.  RCA is a dominant vessel with about 10-20% in-stent restenosis and the  distal lesion which is 95% right before the bifurcation of PDA and PLV.  2.  The PDA has a stent which is patent and a large vessel.    3.  PLV has just mild luminal irregularity and is also patent.  4.  Left main is a large vessel which does not demonstrate a critical lesion.      5.  Circumflex, large vessel which is patent.  6.  LAD stent is patent with maybe 10% lesion.  7.  Left ventriculography, ejection fraction 60%.  8.  No complication occurred.  The patient is to undergo intervention by Dr. Guerra of this high-grade lesion in the distal RCA.    Charles Sosa D.O.  10/18/2016       Conclusions      Procedure Summary   Left main patent   LAD stent 10% ins stent re stenosis   Lcx is patent      RCA with 20% in stent re stenosis   Distal RCA has 95% lesion   RCA/PDA/PLV strents are patent      S/P successful PCI of the distal Right Coronary Artery with 3.5-34 mm drug   eluting stent, post dilated up to 3.6 mm.     D: 10/14/2016    FINDINGS:        SHILOH    RIGHT        TONY----->0.96    PTA----->1.08        SHILOH    LEFT

## 2025-07-23 RX ORDER — EVOLOCUMAB 140 MG/ML
INJECTION, SOLUTION SUBCUTANEOUS
Qty: 6 ADJUSTABLE DOSE PRE-FILLED PEN SYRINGE | Refills: 1 | Status: SHIPPED | OUTPATIENT
Start: 2025-07-23